# Patient Record
Sex: MALE | Race: WHITE | URBAN - METROPOLITAN AREA
[De-identification: names, ages, dates, MRNs, and addresses within clinical notes are randomized per-mention and may not be internally consistent; named-entity substitution may affect disease eponyms.]

---

## 2021-05-27 ENCOUNTER — RECORDS - HEALTHEAST (OUTPATIENT)
Dept: ADMINISTRATIVE | Facility: CLINIC | Age: 76
End: 2021-05-27

## 2021-05-29 ENCOUNTER — RECORDS - HEALTHEAST (OUTPATIENT)
Dept: ADMINISTRATIVE | Facility: CLINIC | Age: 76
End: 2021-05-29

## 2025-03-05 ENCOUNTER — APPOINTMENT (OUTPATIENT)
Dept: CT IMAGING | Facility: HOSPITAL | Age: 80
DRG: 494 | End: 2025-03-05
Attending: STUDENT IN AN ORGANIZED HEALTH CARE EDUCATION/TRAINING PROGRAM
Payer: COMMERCIAL

## 2025-03-05 ENCOUNTER — HOSPITAL ENCOUNTER (INPATIENT)
Facility: HOSPITAL | Age: 80
DRG: 494 | End: 2025-03-05
Attending: EMERGENCY MEDICINE | Admitting: STUDENT IN AN ORGANIZED HEALTH CARE EDUCATION/TRAINING PROGRAM
Payer: COMMERCIAL

## 2025-03-05 ENCOUNTER — APPOINTMENT (OUTPATIENT)
Dept: RADIOLOGY | Facility: HOSPITAL | Age: 80
DRG: 494 | End: 2025-03-05
Attending: EMERGENCY MEDICINE
Payer: COMMERCIAL

## 2025-03-05 DIAGNOSIS — S82.832A CLOSED FRACTURE OF DISTAL END OF LEFT FIBULA, UNSPECIFIED FRACTURE MORPHOLOGY, INITIAL ENCOUNTER: ICD-10-CM

## 2025-03-05 DIAGNOSIS — S82.832A CLOSED FRACTURE OF PROXIMAL END OF LEFT FIBULA, UNSPECIFIED FRACTURE MORPHOLOGY, INITIAL ENCOUNTER: ICD-10-CM

## 2025-03-05 DIAGNOSIS — I77.810 AORTIC ROOT DILATION: ICD-10-CM
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
## 2025-03-05 DIAGNOSIS — S82.042A CLOSED DISPLACED COMMINUTED FRACTURE OF LEFT PATELLA, INITIAL ENCOUNTER: Primary | ICD-10-CM

## 2025-03-05 DIAGNOSIS — Z78.9 ALCOHOL USE: ICD-10-CM

## 2025-03-05 PROBLEM — C44.91 BCC (BASAL CELL CARCINOMA): Status: ACTIVE | Noted: 2025-03-05

## 2025-03-05 PROBLEM — R73.02 IGT (IMPAIRED GLUCOSE TOLERANCE): Status: ACTIVE | Noted: 2025-03-05

## 2025-03-05 PROBLEM — N20.0 NEPHROLITHIASIS: Status: ACTIVE | Noted: 2025-03-05

## 2025-03-05 LAB
ALBUMIN SERPL BCG-MCNC: 4.4 G/DL (ref 3.5–5.2)
ALP SERPL-CCNC: 73 U/L (ref 40–150)
ALT SERPL W P-5'-P-CCNC: 25 U/L (ref 0–70)
ANION GAP SERPL CALCULATED.3IONS-SCNC: 12 MMOL/L (ref 7–15)
AST SERPL W P-5'-P-CCNC: 34 U/L (ref 0–45)
ATRIAL RATE - MUSE: 78 BPM
BASOPHILS # BLD AUTO: 0.1 10E3/UL (ref 0–0.2)
BASOPHILS NFR BLD AUTO: 1 %
BILIRUB DIRECT SERPL-MCNC: 0.09 MG/DL (ref 0–0.3)
BILIRUB SERPL-MCNC: 0.6 MG/DL
BUN SERPL-MCNC: 22.8 MG/DL (ref 8–23)
CALCIUM SERPL-MCNC: 9.6 MG/DL (ref 8.8–10.4)
CHLORIDE SERPL-SCNC: 105 MMOL/L (ref 98–107)
CREAT SERPL-MCNC: 1.01 MG/DL (ref 0.67–1.17)
DIASTOLIC BLOOD PRESSURE - MUSE: 80 MMHG
EGFRCR SERPLBLD CKD-EPI 2021: 76 ML/MIN/1.73M2
EOSINOPHIL # BLD AUTO: 0.2 10E3/UL (ref 0–0.7)
EOSINOPHIL NFR BLD AUTO: 2 %
ERYTHROCYTE [DISTWIDTH] IN BLOOD BY AUTOMATED COUNT: 13.2 % (ref 10–15)
ETHANOL SERPL-MCNC: <0.01 G/DL
GLUCOSE SERPL-MCNC: 123 MG/DL (ref 70–99)
HCO3 SERPL-SCNC: 23 MMOL/L (ref 22–29)
HCT VFR BLD AUTO: 44.6 % (ref 40–53)
HGB BLD-MCNC: 15.2 G/DL (ref 13.3–17.7)
IMM GRANULOCYTES # BLD: 0.1 10E3/UL
IMM GRANULOCYTES NFR BLD: 1 %
INR PPP: 1.02 (ref 0.85–1.15)
INTERPRETATION ECG - MUSE: NORMAL
LYMPHOCYTES # BLD AUTO: 2.1 10E3/UL (ref 0.8–5.3)
LYMPHOCYTES NFR BLD AUTO: 23 %
MAGNESIUM SERPL-MCNC: 2.1 MG/DL (ref 1.7–2.3)
MCH RBC QN AUTO: 31.3 PG (ref 26.5–33)
MCHC RBC AUTO-ENTMCNC: 34.1 G/DL (ref 31.5–36.5)
MCV RBC AUTO: 92 FL (ref 78–100)
MONOCYTES # BLD AUTO: 0.9 10E3/UL (ref 0–1.3)
MONOCYTES NFR BLD AUTO: 9 %
NEUTROPHILS # BLD AUTO: 5.9 10E3/UL (ref 1.6–8.3)
NEUTROPHILS NFR BLD AUTO: 64 %
NRBC # BLD AUTO: 0 10E3/UL
NRBC BLD AUTO-RTO: 0 /100
P AXIS - MUSE: 41 DEGREES
PLATELET # BLD AUTO: 179 10E3/UL (ref 150–450)
POTASSIUM SERPL-SCNC: 4.7 MMOL/L (ref 3.4–5.3)
PR INTERVAL - MUSE: 204 MS
PROT SERPL-MCNC: 7.2 G/DL (ref 6.4–8.3)
QRS DURATION - MUSE: 100 MS
QT - MUSE: 410 MS
QTC - MUSE: 467 MS
R AXIS - MUSE: 39 DEGREES
RBC # BLD AUTO: 4.85 10E6/UL (ref 4.4–5.9)
SODIUM SERPL-SCNC: 140 MMOL/L (ref 135–145)
SYSTOLIC BLOOD PRESSURE - MUSE: 155 MMHG
T AXIS - MUSE: 8 DEGREES
TSH SERPL DL<=0.005 MIU/L-ACNC: 2.47 UIU/ML (ref 0.3–4.2)
VENTRICULAR RATE- MUSE: 78 BPM
WBC # BLD AUTO: 9.2 10E3/UL (ref 4–11)

## 2025-03-05 PROCEDURE — 36415 COLL VENOUS BLD VENIPUNCTURE: CPT | Performed by: EMERGENCY MEDICINE

## 2025-03-05 PROCEDURE — 82077 ASSAY SPEC XCP UR&BREATH IA: CPT | Performed by: EMERGENCY MEDICINE

## 2025-03-05 PROCEDURE — 93005 ELECTROCARDIOGRAM TRACING: CPT | Mod: 76

## 2025-03-05 PROCEDURE — 93005 ELECTROCARDIOGRAM TRACING: CPT

## 2025-03-05 PROCEDURE — 85025 COMPLETE CBC W/AUTO DIFF WBC: CPT | Performed by: EMERGENCY MEDICINE

## 2025-03-05 PROCEDURE — 73700 CT LOWER EXTREMITY W/O DYE: CPT | Mod: LT,XS

## 2025-03-05 PROCEDURE — 73610 X-RAY EXAM OF ANKLE: CPT | Mod: LT

## 2025-03-05 PROCEDURE — 73560 X-RAY EXAM OF KNEE 1 OR 2: CPT | Mod: LT

## 2025-03-05 PROCEDURE — 82248 BILIRUBIN DIRECT: CPT | Performed by: EMERGENCY MEDICINE

## 2025-03-05 PROCEDURE — 120N000001 HC R&B MED SURG/OB

## 2025-03-05 PROCEDURE — 80053 COMPREHEN METABOLIC PANEL: CPT | Performed by: EMERGENCY MEDICINE

## 2025-03-05 PROCEDURE — 84443 ASSAY THYROID STIM HORMONE: CPT | Performed by: EMERGENCY MEDICINE

## 2025-03-05 PROCEDURE — 80048 BASIC METABOLIC PNL TOTAL CA: CPT | Performed by: EMERGENCY MEDICINE

## 2025-03-05 PROCEDURE — 85610 PROTHROMBIN TIME: CPT | Performed by: EMERGENCY MEDICINE

## 2025-03-05 PROCEDURE — 29505 APPLICATION LONG LEG SPLINT: CPT | Mod: LT

## 2025-03-05 PROCEDURE — 93005 ELECTROCARDIOGRAM TRACING: CPT | Performed by: EMERGENCY MEDICINE

## 2025-03-05 PROCEDURE — 99285 EMERGENCY DEPT VISIT HI MDM: CPT | Mod: 25

## 2025-03-05 PROCEDURE — 80076 HEPATIC FUNCTION PANEL: CPT | Performed by: EMERGENCY MEDICINE

## 2025-03-05 PROCEDURE — 83735 ASSAY OF MAGNESIUM: CPT | Performed by: EMERGENCY MEDICINE

## 2025-03-05 PROCEDURE — 73700 CT LOWER EXTREMITY W/O DYE: CPT | Mod: LT

## 2025-03-05 PROCEDURE — 99222 1ST HOSP IP/OBS MODERATE 55: CPT | Mod: AI | Performed by: STUDENT IN AN ORGANIZED HEALTH CARE EDUCATION/TRAINING PROGRAM

## 2025-03-05 PROCEDURE — 250N000013 HC RX MED GY IP 250 OP 250 PS 637: Performed by: STUDENT IN AN ORGANIZED HEALTH CARE EDUCATION/TRAINING PROGRAM

## 2025-03-05 RX ORDER — LIDOCAINE 40 MG/G
CREAM TOPICAL
Status: DISCONTINUED | OUTPATIENT
Start: 2025-03-05 | End: 2025-03-07

## 2025-03-05 RX ORDER — ACETAMINOPHEN 325 MG/1
650 TABLET ORAL EVERY 4 HOURS PRN
Status: DISCONTINUED | OUTPATIENT
Start: 2025-03-05 | End: 2025-03-11 | Stop reason: HOSPADM

## 2025-03-05 RX ORDER — AMOXICILLIN 250 MG
1 CAPSULE ORAL 2 TIMES DAILY PRN
Status: DISCONTINUED | OUTPATIENT
Start: 2025-03-05 | End: 2025-03-11 | Stop reason: HOSPADM

## 2025-03-05 RX ORDER — OXYCODONE HYDROCHLORIDE 5 MG/1
5 TABLET ORAL EVERY 4 HOURS PRN
Status: DISCONTINUED | OUTPATIENT
Start: 2025-03-05 | End: 2025-03-06

## 2025-03-05 RX ORDER — ONDANSETRON 2 MG/ML
4 INJECTION INTRAMUSCULAR; INTRAVENOUS EVERY 6 HOURS PRN
Status: DISCONTINUED | OUTPATIENT
Start: 2025-03-05 | End: 2025-03-11 | Stop reason: HOSPADM

## 2025-03-05 RX ORDER — LORAZEPAM 2 MG/ML
1-2 INJECTION INTRAMUSCULAR EVERY 30 MIN PRN
Status: DISCONTINUED | OUTPATIENT
Start: 2025-03-05 | End: 2025-03-11 | Stop reason: HOSPADM

## 2025-03-05 RX ORDER — MULTIPLE VITAMINS W/ MINERALS TAB 9MG-400MCG
1 TAB ORAL DAILY
Status: DISCONTINUED | OUTPATIENT
Start: 2025-03-06 | End: 2025-03-11 | Stop reason: HOSPADM

## 2025-03-05 RX ORDER — HYDROMORPHONE HYDROCHLORIDE 1 MG/ML
0.3 INJECTION, SOLUTION INTRAMUSCULAR; INTRAVENOUS; SUBCUTANEOUS
Status: DISCONTINUED | OUTPATIENT
Start: 2025-03-05 | End: 2025-03-07

## 2025-03-05 RX ORDER — CALCIUM CARBONATE 500 MG/1
1000 TABLET, CHEWABLE ORAL 4 TIMES DAILY PRN
Status: DISCONTINUED | OUTPATIENT
Start: 2025-03-05 | End: 2025-03-11 | Stop reason: HOSPADM

## 2025-03-05 RX ORDER — POLYETHYLENE GLYCOL 3350 17 G/17G
17 POWDER, FOR SOLUTION ORAL 2 TIMES DAILY PRN
Status: DISCONTINUED | OUTPATIENT
Start: 2025-03-05 | End: 2025-03-11 | Stop reason: HOSPADM

## 2025-03-05 RX ORDER — ACETAMINOPHEN 650 MG/1
650 SUPPOSITORY RECTAL EVERY 4 HOURS PRN
Status: DISCONTINUED | OUTPATIENT
Start: 2025-03-05 | End: 2025-03-11 | Stop reason: HOSPADM

## 2025-03-05 RX ORDER — OXYCODONE HYDROCHLORIDE 5 MG/1
10 TABLET ORAL EVERY 4 HOURS PRN
Status: DISCONTINUED | OUTPATIENT
Start: 2025-03-05 | End: 2025-03-06

## 2025-03-05 RX ORDER — ONDANSETRON 4 MG/1
4 TABLET, ORALLY DISINTEGRATING ORAL EVERY 6 HOURS PRN
Status: DISCONTINUED | OUTPATIENT
Start: 2025-03-05 | End: 2025-03-11 | Stop reason: HOSPADM

## 2025-03-05 RX ORDER — LORAZEPAM 0.5 MG/1
1-2 TABLET ORAL EVERY 30 MIN PRN
Status: DISCONTINUED | OUTPATIENT
Start: 2025-03-05 | End: 2025-03-11 | Stop reason: HOSPADM

## 2025-03-05 RX ORDER — FOLIC ACID 1 MG/1
1 TABLET ORAL DAILY
Status: DISCONTINUED | OUTPATIENT
Start: 2025-03-06 | End: 2025-03-11 | Stop reason: HOSPADM

## 2025-03-05 RX ORDER — AMOXICILLIN 250 MG
2 CAPSULE ORAL 2 TIMES DAILY PRN
Status: DISCONTINUED | OUTPATIENT
Start: 2025-03-05 | End: 2025-03-11 | Stop reason: HOSPADM

## 2025-03-05 RX ORDER — FLUMAZENIL 0.1 MG/ML
0.2 INJECTION, SOLUTION INTRAVENOUS
Status: DISCONTINUED | OUTPATIENT
Start: 2025-03-05 | End: 2025-03-11 | Stop reason: HOSPADM

## 2025-03-05 RX ADMIN — ACETAMINOPHEN 650 MG: 325 TABLET ORAL at 23:31

## 2025-03-05 RX ADMIN — OXYCODONE HYDROCHLORIDE 10 MG: 5 TABLET ORAL at 23:31

## 2025-03-05 ASSESSMENT — ACTIVITIES OF DAILY LIVING (ADL)
ADLS_ACUITY_SCORE: 45

## 2025-03-05 ASSESSMENT — LIFESTYLE VARIABLES
AUDITORY DISTURBANCES: NOT PRESENT
ANXIETY: MILDLY ANXIOUS
VISUAL DISTURBANCES: NOT PRESENT
NAUSEA AND VOMITING: NO NAUSEA AND NO VOMITING
PAROXYSMAL SWEATS: NO SWEAT VISIBLE
AGITATION: NORMAL ACTIVITY
TREMOR: NO TREMOR
ORIENTATION AND CLOUDING OF SENSORIUM: ORIENTED AND CAN DO SERIAL ADDITIONS
HEADACHE, FULLNESS IN HEAD: NOT PRESENT
TOTAL SCORE: 1

## 2025-03-06 ENCOUNTER — APPOINTMENT (OUTPATIENT)
Dept: RADIOLOGY | Facility: HOSPITAL | Age: 80
DRG: 494 | End: 2025-03-06
Attending: STUDENT IN AN ORGANIZED HEALTH CARE EDUCATION/TRAINING PROGRAM
Payer: COMMERCIAL

## 2025-03-06 VITALS
RESPIRATION RATE: 18 BRPM | OXYGEN SATURATION: 95 % | DIASTOLIC BLOOD PRESSURE: 72 MMHG | SYSTOLIC BLOOD PRESSURE: 152 MMHG | HEART RATE: 75 BPM | WEIGHT: 210 LBS | TEMPERATURE: 98.1 F

## 2025-03-06 LAB
ABO + RH BLD: NORMAL
ANION GAP SERPL CALCULATED.3IONS-SCNC: 10 MMOL/L (ref 7–15)
BLD GP AB SCN SERPL QL: NEGATIVE
BUN SERPL-MCNC: 20.3 MG/DL (ref 8–23)
CALCIUM SERPL-MCNC: 9.1 MG/DL (ref 8.8–10.4)
CHLORIDE SERPL-SCNC: 106 MMOL/L (ref 98–107)
CREAT SERPL-MCNC: 0.93 MG/DL (ref 0.67–1.17)
EGFRCR SERPLBLD CKD-EPI 2021: 84 ML/MIN/1.73M2
ERYTHROCYTE [DISTWIDTH] IN BLOOD BY AUTOMATED COUNT: 13.2 % (ref 10–15)
GLUCOSE SERPL-MCNC: 114 MG/DL (ref 70–99)
HCO3 SERPL-SCNC: 24 MMOL/L (ref 22–29)
HCT VFR BLD AUTO: 41.7 % (ref 40–53)
HGB BLD-MCNC: 14.3 G/DL (ref 13.3–17.7)
MAGNESIUM SERPL-MCNC: 2.1 MG/DL (ref 1.7–2.3)
MCH RBC QN AUTO: 31.1 PG (ref 26.5–33)
MCHC RBC AUTO-ENTMCNC: 34.3 G/DL (ref 31.5–36.5)
MCV RBC AUTO: 91 FL (ref 78–100)
PHOSPHATE SERPL-MCNC: 2.9 MG/DL (ref 2.5–4.5)
PLATELET # BLD AUTO: 162 10E3/UL (ref 150–450)
POTASSIUM SERPL-SCNC: 4.1 MMOL/L (ref 3.4–5.3)
RBC # BLD AUTO: 4.6 10E6/UL (ref 4.4–5.9)
SODIUM SERPL-SCNC: 140 MMOL/L (ref 135–145)
SPECIMEN EXP DATE BLD: NORMAL
WBC # BLD AUTO: 9.5 10E3/UL (ref 4–11)

## 2025-03-06 PROCEDURE — 250N000013 HC RX MED GY IP 250 OP 250 PS 637: Performed by: STUDENT IN AN ORGANIZED HEALTH CARE EDUCATION/TRAINING PROGRAM

## 2025-03-06 PROCEDURE — 120N000001 HC R&B MED SURG/OB

## 2025-03-06 PROCEDURE — C1713 ANCHOR/SCREW BN/BN,TIS/BN: HCPCS | Performed by: STUDENT IN AN ORGANIZED HEALTH CARE EDUCATION/TRAINING PROGRAM

## 2025-03-06 PROCEDURE — 710N000009 HC RECOVERY PHASE 1, LEVEL 1, PER MIN: Performed by: STUDENT IN AN ORGANIZED HEALTH CARE EDUCATION/TRAINING PROGRAM

## 2025-03-06 PROCEDURE — 0QSK04Z REPOSITION LEFT FIBULA WITH INTERNAL FIXATION DEVICE, OPEN APPROACH: ICD-10-PCS | Performed by: STUDENT IN AN ORGANIZED HEALTH CARE EDUCATION/TRAINING PROGRAM

## 2025-03-06 PROCEDURE — 85014 HEMATOCRIT: CPT | Performed by: STUDENT IN AN ORGANIZED HEALTH CARE EDUCATION/TRAINING PROGRAM

## 2025-03-06 PROCEDURE — 36415 COLL VENOUS BLD VENIPUNCTURE: CPT | Performed by: STUDENT IN AN ORGANIZED HEALTH CARE EDUCATION/TRAINING PROGRAM

## 2025-03-06 PROCEDURE — 258N000003 HC RX IP 258 OP 636: Performed by: STUDENT IN AN ORGANIZED HEALTH CARE EDUCATION/TRAINING PROGRAM

## 2025-03-06 PROCEDURE — 999N000180 XR SURGERY CARM FLUORO LESS THAN 5 MIN

## 2025-03-06 PROCEDURE — 83735 ASSAY OF MAGNESIUM: CPT | Performed by: STUDENT IN AN ORGANIZED HEALTH CARE EDUCATION/TRAINING PROGRAM

## 2025-03-06 PROCEDURE — 999N000141 HC STATISTIC PRE-PROCEDURE NURSING ASSESSMENT: Performed by: STUDENT IN AN ORGANIZED HEALTH CARE EDUCATION/TRAINING PROGRAM

## 2025-03-06 PROCEDURE — 80048 BASIC METABOLIC PNL TOTAL CA: CPT | Performed by: STUDENT IN AN ORGANIZED HEALTH CARE EDUCATION/TRAINING PROGRAM

## 2025-03-06 PROCEDURE — 370N000017 HC ANESTHESIA TECHNICAL FEE, PER MIN: Performed by: STUDENT IN AN ORGANIZED HEALTH CARE EDUCATION/TRAINING PROGRAM

## 2025-03-06 PROCEDURE — 250N000011 HC RX IP 250 OP 636: Performed by: PHYSICIAN ASSISTANT

## 2025-03-06 PROCEDURE — 250N000025 HC SEVOFLURANE, PER MIN: Performed by: STUDENT IN AN ORGANIZED HEALTH CARE EDUCATION/TRAINING PROGRAM

## 2025-03-06 PROCEDURE — 272N000001 HC OR GENERAL SUPPLY STERILE: Performed by: STUDENT IN AN ORGANIZED HEALTH CARE EDUCATION/TRAINING PROGRAM

## 2025-03-06 PROCEDURE — 0QSF04Z REPOSITION LEFT PATELLA WITH INTERNAL FIXATION DEVICE, OPEN APPROACH: ICD-10-PCS | Performed by: STUDENT IN AN ORGANIZED HEALTH CARE EDUCATION/TRAINING PROGRAM

## 2025-03-06 PROCEDURE — 84100 ASSAY OF PHOSPHORUS: CPT | Performed by: STUDENT IN AN ORGANIZED HEALTH CARE EDUCATION/TRAINING PROGRAM

## 2025-03-06 PROCEDURE — 360N000084 HC SURGERY LEVEL 4 W/ FLUORO, PER MIN: Performed by: STUDENT IN AN ORGANIZED HEALTH CARE EDUCATION/TRAINING PROGRAM

## 2025-03-06 PROCEDURE — 250N000011 HC RX IP 250 OP 636: Performed by: STUDENT IN AN ORGANIZED HEALTH CARE EDUCATION/TRAINING PROGRAM

## 2025-03-06 PROCEDURE — 250N000013 HC RX MED GY IP 250 OP 250 PS 637: Performed by: PHYSICIAN ASSISTANT

## 2025-03-06 PROCEDURE — 86900 BLOOD TYPING SEROLOGIC ABO: CPT | Performed by: PHYSICIAN ASSISTANT

## 2025-03-06 PROCEDURE — 271N000001 HC OR GENERAL SUPPLY NON-STERILE: Performed by: STUDENT IN AN ORGANIZED HEALTH CARE EDUCATION/TRAINING PROGRAM

## 2025-03-06 DEVICE — IMP SCR SYN CAN 4.0X14MM FT SS 206.014: Type: IMPLANTABLE DEVICE | Site: ANKLE | Status: FUNCTIONAL

## 2025-03-06 DEVICE — IMPLANTABLE DEVICE: Type: IMPLANTABLE DEVICE | Site: ANKLE | Status: FUNCTIONAL

## 2025-03-06 DEVICE — IMP SCR SYN CORTEX 3.5X22MM SELF TAP SS 204.822: Type: IMPLANTABLE DEVICE | Site: ANKLE | Status: FUNCTIONAL

## 2025-03-06 DEVICE — IMP SCR SYN 2.7X16MM T8 SD LOCKING SELF-TAP VA 02.211.016: Type: IMPLANTABLE DEVICE | Site: KNEE | Status: FUNCTIONAL

## 2025-03-06 DEVICE — IMPLANTABLE DEVICE: Type: IMPLANTABLE DEVICE | Site: KNEE | Status: FUNCTIONAL

## 2025-03-06 DEVICE — IMP PLATE SYN 1/3 TUBULAR 93MM 08H SS 241.381: Type: IMPLANTABLE DEVICE | Site: ANKLE | Status: FUNCTIONAL

## 2025-03-06 DEVICE — IMP SCR SYN CAN 4.0X18MM FT SS 206.018: Type: IMPLANTABLE DEVICE | Site: ANKLE | Status: FUNCTIONAL

## 2025-03-06 DEVICE — IMP SCR SYN 2.7X14MM T8 SD LOCKING SELF-TAP VA 02.211.014: Type: IMPLANTABLE DEVICE | Site: KNEE | Status: FUNCTIONAL

## 2025-03-06 DEVICE — IMP SCR SYN CAN 4.0X16MM FT SS 206.016: Type: IMPLANTABLE DEVICE | Site: ANKLE | Status: FUNCTIONAL

## 2025-03-06 DEVICE — IMP SCR SYN CORTEX 3.5X14MM SELF TAP SS 204.814: Type: IMPLANTABLE DEVICE | Site: ANKLE | Status: FUNCTIONAL

## 2025-03-06 RX ORDER — NALOXONE HYDROCHLORIDE 0.4 MG/ML
0.1 INJECTION, SOLUTION INTRAMUSCULAR; INTRAVENOUS; SUBCUTANEOUS
Status: DISCONTINUED | OUTPATIENT
Start: 2025-03-06 | End: 2025-03-06 | Stop reason: HOSPADM

## 2025-03-06 RX ORDER — LIDOCAINE 40 MG/G
CREAM TOPICAL
Status: DISCONTINUED | OUTPATIENT
Start: 2025-03-06 | End: 2025-03-06 | Stop reason: HOSPADM

## 2025-03-06 RX ORDER — FENTANYL CITRATE 50 UG/ML
25-100 INJECTION, SOLUTION INTRAMUSCULAR; INTRAVENOUS
Status: DISCONTINUED | OUTPATIENT
Start: 2025-03-06 | End: 2025-03-06 | Stop reason: HOSPADM

## 2025-03-06 RX ORDER — AMOXICILLIN 250 MG
1 CAPSULE ORAL 2 TIMES DAILY
Status: DISCONTINUED | OUTPATIENT
Start: 2025-03-06 | End: 2025-03-11 | Stop reason: HOSPADM

## 2025-03-06 RX ORDER — MAGNESIUM SULFATE 4 G/50ML
4 INJECTION INTRAVENOUS ONCE
Status: COMPLETED | OUTPATIENT
Start: 2025-03-06 | End: 2025-03-06

## 2025-03-06 RX ORDER — NALOXONE HYDROCHLORIDE 0.4 MG/ML
0.2 INJECTION, SOLUTION INTRAMUSCULAR; INTRAVENOUS; SUBCUTANEOUS
Status: DISCONTINUED | OUTPATIENT
Start: 2025-03-06 | End: 2025-03-11 | Stop reason: HOSPADM

## 2025-03-06 RX ORDER — SODIUM CHLORIDE, SODIUM LACTATE, POTASSIUM CHLORIDE, CALCIUM CHLORIDE 600; 310; 30; 20 MG/100ML; MG/100ML; MG/100ML; MG/100ML
INJECTION, SOLUTION INTRAVENOUS CONTINUOUS
Status: DISCONTINUED | OUTPATIENT
Start: 2025-03-06 | End: 2025-03-06 | Stop reason: HOSPADM

## 2025-03-06 RX ORDER — OXYCODONE HYDROCHLORIDE 5 MG/1
10 TABLET ORAL
Status: CANCELLED | OUTPATIENT
Start: 2025-03-06

## 2025-03-06 RX ORDER — NALOXONE HYDROCHLORIDE 0.4 MG/ML
0.4 INJECTION, SOLUTION INTRAMUSCULAR; INTRAVENOUS; SUBCUTANEOUS
Status: DISCONTINUED | OUTPATIENT
Start: 2025-03-06 | End: 2025-03-11 | Stop reason: HOSPADM

## 2025-03-06 RX ORDER — ACETAMINOPHEN 325 MG/1
975 TABLET ORAL ONCE
Status: COMPLETED | OUTPATIENT
Start: 2025-03-06 | End: 2025-03-06

## 2025-03-06 RX ORDER — HYDROMORPHONE HCL IN WATER/PF 6 MG/30 ML
0.2 PATIENT CONTROLLED ANALGESIA SYRINGE INTRAVENOUS
Status: DISCONTINUED | OUTPATIENT
Start: 2025-03-06 | End: 2025-03-09

## 2025-03-06 RX ORDER — CEFAZOLIN SODIUM/WATER 2 G/20 ML
2 SYRINGE (ML) INTRAVENOUS
Status: DISCONTINUED | OUTPATIENT
Start: 2025-03-06 | End: 2025-03-06

## 2025-03-06 RX ORDER — SODIUM CHLORIDE, SODIUM LACTATE, POTASSIUM CHLORIDE, CALCIUM CHLORIDE 600; 310; 30; 20 MG/100ML; MG/100ML; MG/100ML; MG/100ML
INJECTION, SOLUTION INTRAVENOUS CONTINUOUS
Status: DISCONTINUED | OUTPATIENT
Start: 2025-03-06 | End: 2025-03-07

## 2025-03-06 RX ORDER — FENTANYL CITRATE 50 UG/ML
25 INJECTION, SOLUTION INTRAMUSCULAR; INTRAVENOUS EVERY 5 MIN PRN
Status: DISCONTINUED | OUTPATIENT
Start: 2025-03-06 | End: 2025-03-06 | Stop reason: HOSPADM

## 2025-03-06 RX ORDER — IBUPROFEN 600 MG/1
600 TABLET, FILM COATED ORAL EVERY 6 HOURS PRN
Status: DISCONTINUED | OUTPATIENT
Start: 2025-03-06 | End: 2025-03-11 | Stop reason: HOSPADM

## 2025-03-06 RX ORDER — POLYETHYLENE GLYCOL 3350 17 G/17G
17 POWDER, FOR SOLUTION ORAL DAILY
Status: DISCONTINUED | OUTPATIENT
Start: 2025-03-07 | End: 2025-03-11 | Stop reason: HOSPADM

## 2025-03-06 RX ORDER — ASPIRIN 81 MG/1
81 TABLET ORAL 2 TIMES DAILY
Status: DISCONTINUED | OUTPATIENT
Start: 2025-03-06 | End: 2025-03-11 | Stop reason: HOSPADM

## 2025-03-06 RX ORDER — OXYCODONE HYDROCHLORIDE 5 MG/1
5 TABLET ORAL EVERY 4 HOURS PRN
Status: DISCONTINUED | OUTPATIENT
Start: 2025-03-06 | End: 2025-03-11 | Stop reason: HOSPADM

## 2025-03-06 RX ORDER — OXYCODONE HYDROCHLORIDE 5 MG/1
10 TABLET ORAL EVERY 4 HOURS PRN
Status: DISCONTINUED | OUTPATIENT
Start: 2025-03-06 | End: 2025-03-11 | Stop reason: HOSPADM

## 2025-03-06 RX ORDER — HYDROXYZINE HYDROCHLORIDE 10 MG/1
10 TABLET, FILM COATED ORAL EVERY 6 HOURS PRN
Status: DISCONTINUED | OUTPATIENT
Start: 2025-03-06 | End: 2025-03-11 | Stop reason: HOSPADM

## 2025-03-06 RX ORDER — CEFAZOLIN SODIUM/WATER 2 G/20 ML
2 SYRINGE (ML) INTRAVENOUS EVERY 8 HOURS
Status: DISCONTINUED | OUTPATIENT
Start: 2025-03-06 | End: 2025-03-06

## 2025-03-06 RX ORDER — HYDROMORPHONE HCL IN WATER/PF 6 MG/30 ML
0.4 PATIENT CONTROLLED ANALGESIA SYRINGE INTRAVENOUS EVERY 5 MIN PRN
Status: DISCONTINUED | OUTPATIENT
Start: 2025-03-06 | End: 2025-03-06 | Stop reason: HOSPADM

## 2025-03-06 RX ORDER — CEFAZOLIN SODIUM/WATER 2 G/20 ML
2 SYRINGE (ML) INTRAVENOUS SEE ADMIN INSTRUCTIONS
Status: DISCONTINUED | OUTPATIENT
Start: 2025-03-06 | End: 2025-03-06

## 2025-03-06 RX ORDER — ONDANSETRON 4 MG/1
4 TABLET, ORALLY DISINTEGRATING ORAL EVERY 30 MIN PRN
Status: DISCONTINUED | OUTPATIENT
Start: 2025-03-06 | End: 2025-03-06 | Stop reason: HOSPADM

## 2025-03-06 RX ORDER — FENTANYL CITRATE 50 UG/ML
50 INJECTION, SOLUTION INTRAMUSCULAR; INTRAVENOUS EVERY 5 MIN PRN
Status: DISCONTINUED | OUTPATIENT
Start: 2025-03-06 | End: 2025-03-06 | Stop reason: HOSPADM

## 2025-03-06 RX ORDER — ACETAMINOPHEN 325 MG/1
975 TABLET ORAL ONCE
Status: DISCONTINUED | OUTPATIENT
Start: 2025-03-06 | End: 2025-03-06

## 2025-03-06 RX ORDER — HYDROMORPHONE HYDROCHLORIDE 1 MG/ML
0.5 INJECTION, SOLUTION INTRAMUSCULAR; INTRAVENOUS; SUBCUTANEOUS
Status: DISCONTINUED | OUTPATIENT
Start: 2025-03-06 | End: 2025-03-09

## 2025-03-06 RX ORDER — ONDANSETRON 2 MG/ML
4 INJECTION INTRAMUSCULAR; INTRAVENOUS EVERY 6 HOURS PRN
Status: DISCONTINUED | OUTPATIENT
Start: 2025-03-06 | End: 2025-03-06

## 2025-03-06 RX ORDER — PROCHLORPERAZINE MALEATE 5 MG/1
5 TABLET ORAL EVERY 6 HOURS PRN
Status: DISCONTINUED | OUTPATIENT
Start: 2025-03-06 | End: 2025-03-11 | Stop reason: HOSPADM

## 2025-03-06 RX ORDER — ONDANSETRON 2 MG/ML
4 INJECTION INTRAMUSCULAR; INTRAVENOUS EVERY 30 MIN PRN
Status: DISCONTINUED | OUTPATIENT
Start: 2025-03-06 | End: 2025-03-06 | Stop reason: HOSPADM

## 2025-03-06 RX ORDER — HYDROMORPHONE HCL IN WATER/PF 6 MG/30 ML
0.2 PATIENT CONTROLLED ANALGESIA SYRINGE INTRAVENOUS EVERY 5 MIN PRN
Status: DISCONTINUED | OUTPATIENT
Start: 2025-03-06 | End: 2025-03-06 | Stop reason: HOSPADM

## 2025-03-06 RX ORDER — ONDANSETRON 4 MG/1
4 TABLET, ORALLY DISINTEGRATING ORAL EVERY 6 HOURS PRN
Status: DISCONTINUED | OUTPATIENT
Start: 2025-03-06 | End: 2025-03-06

## 2025-03-06 RX ORDER — DEXAMETHASONE SODIUM PHOSPHATE 4 MG/ML
4 INJECTION, SOLUTION INTRA-ARTICULAR; INTRALESIONAL; INTRAMUSCULAR; INTRAVENOUS; SOFT TISSUE
Status: DISCONTINUED | OUTPATIENT
Start: 2025-03-06 | End: 2025-03-06 | Stop reason: HOSPADM

## 2025-03-06 RX ORDER — OXYCODONE HYDROCHLORIDE 5 MG/1
5 TABLET ORAL
Status: CANCELLED | OUTPATIENT
Start: 2025-03-06

## 2025-03-06 RX ORDER — CEFAZOLIN SODIUM 2 G/50ML
2 SOLUTION INTRAVENOUS EVERY 8 HOURS
Status: COMPLETED | OUTPATIENT
Start: 2025-03-06 | End: 2025-03-07

## 2025-03-06 RX ORDER — ACETAMINOPHEN 325 MG/1
975 TABLET ORAL EVERY 8 HOURS
Status: DISCONTINUED | OUTPATIENT
Start: 2025-03-06 | End: 2025-03-11 | Stop reason: HOSPADM

## 2025-03-06 RX ORDER — BISACODYL 10 MG
10 SUPPOSITORY, RECTAL RECTAL DAILY PRN
Status: DISCONTINUED | OUTPATIENT
Start: 2025-03-09 | End: 2025-03-11 | Stop reason: HOSPADM

## 2025-03-06 RX ORDER — FLUMAZENIL 0.1 MG/ML
0.2 INJECTION, SOLUTION INTRAVENOUS
Status: DISCONTINUED | OUTPATIENT
Start: 2025-03-06 | End: 2025-03-06 | Stop reason: HOSPADM

## 2025-03-06 RX ORDER — NALOXONE HYDROCHLORIDE 0.4 MG/ML
0.1 INJECTION, SOLUTION INTRAMUSCULAR; INTRAVENOUS; SUBCUTANEOUS
Status: CANCELLED | OUTPATIENT
Start: 2025-03-06

## 2025-03-06 RX ORDER — ONDANSETRON 4 MG/1
4 TABLET, ORALLY DISINTEGRATING ORAL EVERY 30 MIN PRN
Status: CANCELLED | OUTPATIENT
Start: 2025-03-06

## 2025-03-06 RX ORDER — LIDOCAINE 40 MG/G
CREAM TOPICAL
Status: DISCONTINUED | OUTPATIENT
Start: 2025-03-06 | End: 2025-03-11 | Stop reason: HOSPADM

## 2025-03-06 RX ORDER — ONDANSETRON 2 MG/ML
4 INJECTION INTRAMUSCULAR; INTRAVENOUS EVERY 30 MIN PRN
Status: CANCELLED | OUTPATIENT
Start: 2025-03-06

## 2025-03-06 RX ORDER — CEFAZOLIN SODIUM/WATER 2 G/20 ML
2 SYRINGE (ML) INTRAVENOUS
Status: COMPLETED | OUTPATIENT
Start: 2025-03-06 | End: 2025-03-06

## 2025-03-06 RX ORDER — CEFAZOLIN SODIUM/WATER 2 G/20 ML
2 SYRINGE (ML) INTRAVENOUS SEE ADMIN INSTRUCTIONS
Status: DISCONTINUED | OUTPATIENT
Start: 2025-03-06 | End: 2025-03-06 | Stop reason: HOSPADM

## 2025-03-06 RX ORDER — DEXAMETHASONE SODIUM PHOSPHATE 10 MG/ML
4 INJECTION, SOLUTION INTRAMUSCULAR; INTRAVENOUS
Status: CANCELLED | OUTPATIENT
Start: 2025-03-06

## 2025-03-06 RX ADMIN — FOLIC ACID 1 MG: 1 TABLET ORAL at 08:22

## 2025-03-06 RX ADMIN — THIAMINE HCL TAB 100 MG 100 MG: 100 TAB at 08:22

## 2025-03-06 RX ADMIN — CEFAZOLIN SODIUM 2 G: 2 SOLUTION INTRAVENOUS at 21:33

## 2025-03-06 RX ADMIN — MAGNESIUM SULFATE HEPTAHYDRATE 4 G: 80 INJECTION, SOLUTION INTRAVENOUS at 13:23

## 2025-03-06 RX ADMIN — OXYCODONE HYDROCHLORIDE 10 MG: 5 TABLET ORAL at 11:35

## 2025-03-06 RX ADMIN — Medication 1 TABLET: at 08:22

## 2025-03-06 RX ADMIN — OXYCODONE HYDROCHLORIDE 10 MG: 5 TABLET ORAL at 04:45

## 2025-03-06 RX ADMIN — ACETAMINOPHEN 975 MG: 325 TABLET ORAL at 13:26

## 2025-03-06 RX ADMIN — FENTANYL CITRATE 50 MCG: 50 INJECTION, SOLUTION INTRAMUSCULAR; INTRAVENOUS at 17:38

## 2025-03-06 RX ADMIN — FENTANYL CITRATE 25 MCG: 50 INJECTION, SOLUTION INTRAMUSCULAR; INTRAVENOUS at 17:43

## 2025-03-06 RX ADMIN — SENNOSIDES AND DOCUSATE SODIUM 1 TABLET: 50; 8.6 TABLET ORAL at 19:25

## 2025-03-06 RX ADMIN — ASPIRIN 81 MG: 81 TABLET, COATED ORAL at 19:25

## 2025-03-06 RX ADMIN — SODIUM CHLORIDE, SODIUM LACTATE, POTASSIUM CHLORIDE, AND CALCIUM CHLORIDE: .6; .31; .03; .02 INJECTION, SOLUTION INTRAVENOUS at 13:30

## 2025-03-06 RX ADMIN — FENTANYL CITRATE 25 MCG: 50 INJECTION, SOLUTION INTRAMUSCULAR; INTRAVENOUS at 17:54

## 2025-03-06 RX ADMIN — ACETAMINOPHEN 975 MG: 325 TABLET ORAL at 19:25

## 2025-03-06 ASSESSMENT — ACTIVITIES OF DAILY LIVING (ADL)
ADLS_ACUITY_SCORE: 42
ADLS_ACUITY_SCORE: 42
ADLS_ACUITY_SCORE: 41
ADLS_ACUITY_SCORE: 42
ADLS_ACUITY_SCORE: 45
ADLS_ACUITY_SCORE: 41
DEPENDENT_IADLS:: INDEPENDENT
ADLS_ACUITY_SCORE: 34
ADLS_ACUITY_SCORE: 41
ADLS_ACUITY_SCORE: 34
ADLS_ACUITY_SCORE: 42
ADLS_ACUITY_SCORE: 41

## 2025-03-06 NOTE — ED NOTES
Bed: JNED-19  Expected date: 3/5/25  Expected time:   Means of arrival:   Comments:  ALLINA; 80M, FALL, OBVIOUS DEFORMITY

## 2025-03-06 NOTE — H&P
[facility]    History and Physical - Hospitalist Service       Date of Admission:  3/5/2025    Assessment & Plan      Kyle Lopez is a 80 year old male with a no significant past medical history who was admitted on 3/5/25 after presenting to [facility] ED for Multiple Fractures of the Left Lower Extremity..    #Left Patella Fracture  #Left Proximal Fibula Fracture  #Left Distal Fibula Fracture  #Left Medial Malleolus Fracture  - XR in the ED showing fractures as above after fall at home - slipping on ice.  - Sounds like mechanical fall - denies dizziness, syncope, LOC.  Plan  - Orthopedic Surgery Team contacted from ED - consult order placed.  - Checking CT of Left Knee, Left Ankle for surgical planning  - NPO after midnight, holding DVT prophylaxis.  - Pain Regimen - Tylenol, oxycodone prn, dilaudid IV prn  - PT/OT consulted  - Patient denies hitting head, Neuro exam normal - hold off on CT Head.    #Mechanical Fall  - Patient with fall as above leading to multiple fractures of LLE.   - Fall sounds mechanical - slipped on ice - no dizziness/syncope  Plan  - Cardiac Monitor  - Checking Echo  - PT/OT as above    #Alcohol Use Disorder  - Patient reports drinking 2-3 drinks everyday  - EtOH level in the ED was 0.00  - Patient denies history of significant withdrawal including DT or seizure.  Plan  - Added CIWA protocol for now  - MVI, folic acid, thiamine started on admission.                Diet: Combination Diet Regular Diet Adult  NPO for Procedure/Surgery per Anesthesia Guidelines Except for: Meds; Clear liquids before procedure/surgery: ADULT (Age GREATER than or Equal to 18 years) - Clear liquids 2 hours before procedure/surgery    DVT Prophylaxis: Pneumatic Compression Devices and holding DVT prophylaxis with plan for surgery.  Monteiro Catheter: Not present  Lines: None     Cardiac Monitoring: ACTIVE order. Indication: Tachyarrhythmias, acute (48 hours)  Code Status: Full Code    Clinically  Significant Risk Factors Present on Admission                                        Disposition Plan     Medically Ready for Discharge: Anticipated in 2-4 Days           Yusuf Donato MD  Hospitalist Service  [facility]  Securely message with [employer] (more info)  Text page via [employer] Paging/Directory     ______________________________________________________________________    Chief Complaint   Mechanical Fall and Left Leg Pain    History is obtained from the patient, electronic health record, emergency department physician, and patient's spouse    History of Present Illness   Kyle Lopez is a 80 year old male with a no significant past medical history who was admitted on 3/5/25 after presenting to [facility] ED for Multiple Fractures of the Left Lower Extremity..    Earlier in the evening on 3/5/25 patient was taking out the garbage at his home when he slipped on some ice in his driveway around 7pm. Patient denies any dizziness, syncope, or LOC contributing to the fall. Patient landed primarily on his left knee and ankle and had immediate pain. Has been unable to walk since falling. Did not hit his head.      Past Medical History    No past medical history on file.    Past Surgical History   No past surgical history on file.    Prior to Admission Medications   None        Social History   I have reviewed this patient's social history and updated it with pertinent information if needed.         Allergies   Allergies   Allergen Reactions    Ciprofloxacin Other (See Comments) and Rash     Patient reports blood in the urine.        Physical Exam   Vital Signs: Temp: 98  F (36.7  C) Temp src: Oral BP: (!) 168/88 Pulse: 87   Resp: 22 SpO2: 97 %      Weight: 0 lbs 0 oz    General: Alert and Oriented x3. Answers questions appropriately. Follows commands.  Eyes:EOMI. PERRL. Normal Conjunctivae.  HENT: Normocephalic. Atraumatic.  Resp: Breath sounds equal throughout. No crackles. No wheezing.  Cardio:  Regular rate and rhythm. No murmurs. No friction rub.  Abd: Soft. Non-distended. Non-tender. Bowel sounds normal. No rebound tenderness.  MSK:  - LLE in knee immobilizer and left ankle brace  Neuro: CN 2-12 grossly intact..  Skin:No rashes. No jaundice.       Medical Decision Making       60 MINUTES SPENT BY ME on the date of service doing chart review, history, exam, documentation & further activities per the note.  MANAGEMENT DISCUSSED with the following over the past 24 hours: RN, ED Physician   Tests ORDERED & REVIEWED in the past 24 hours:  - BMP  - CBC  - Magnesium  Medical complexity over the past 24 hours:  - Prescription DRUG MANAGEMENT performed      Data     I have personally reviewed the following data over the past 24 hrs:    9.2  \   15.2   / 179     140 105 22.8 /  123 (H)   4.7 23 1.01 \     ALT: 25 AST: 34 AP: 73 TBILI: 0.6   ALB: 4.4 TOT PROTEIN: 7.2 LIPASE: N/A     TSH: 2.47 T4: N/A A1C: N/A     INR:  1.02 PTT:  N/A   D-dimer:  N/A Fibrinogen:  N/A       Imaging results reviewed over the past 24 hrs:   Recent Results (from the past 24 hours)   XR Ankle Left G/E 3 Views    Narrative    EXAM: XR ANKLE LEFT G/E 3 VIEWS  LOCATION: [facility]  DATE: 3/5/2025    INDICATION: Fall, ankle swelling.  COMPARISON: None.      Impression    IMPRESSION: Acute mildly displaced oblique fracture of the distal fibula extending distally to the level of the syndesmosis. Equivocal nondisplaced fracture medial malleolus versus prominent nutrient foramen. Mild tibiotalar joint arthrosis. Ankle   mortise is intact. No evidence for a posterior malleolus fracture on this study. Soft tissue swelling. Bones are demineralized.    NOTE: ABNORMAL REPORT    THE DICTATION ABOVE DESCRIBES AN ABNORMALITY FOR WHICH FOLLOW-UP IS NEEDED.    XR Knee Left 1/2 Views    Narrative    EXAM: XR KNEE LEFT 1/2 VIEWS  LOCATION: [facility]  DATE: 3/5/2025    INDICATION: Fall, knee pain, ruptured  extensor mechanism.  COMPARISON: None.      Impression    IMPRESSION: Comminuted, mildly displaced fracture involving the proximal fibular shaft extending towards the fibular neck.    Comminuted and markedly distracted transverse fracture through the patella with up to 6 cm of fracture fragment distraction. There is soft tissue swelling and a joint effusion. Chondrocalcinosis.    NOTE: ABNORMAL REPORT    THE DICTATION ABOVE DESCRIBES AN ABNORMALITY FOR WHICH FOLLOW-UP IS NEEDED.    CT Knee Left w/o Contrast    Narrative    EXAM: CT KNEE LEFT W/O CONTRAST  LOCATION: [facility]  DATE: 3/5/2025    INDICATION: Left knee fractures.  COMPARISON: Same day left knee radiographs.  TECHNIQUE: Noncontrast. Axial, sagittal and coronal thin-section reconstruction. Dose reduction techniques were used.     FINDINGS:     There is a comminuted, transversely oriented fracture of the patella, which is distracted approximately 2 cm. There are hemorrhagic contusive changes within the anterior left knee soft tissues. Several small foci of gas are also noted within the   overlying subcutaneous tissues; an open fracture cannot be entirely excluded. Small lipohemarthrosis, without evidence of intra-articular gas.    Additional acute, minimally displaced fracture of the proximal fibular shaft.     No additional sites of fracture identified. No suspicious lytic or blastic osseous lesions. 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    Mild senescent atrophy and fatty infiltration of the visualized left lower extremity musculature.      Impression    IMPRESSION:  1.  Comminuted and distracted fracture of the left patella. Several small foci of gas are noted within the overlying subcutaneous tissues; an open fracture cannot be entirely excluded, though no intra-articular gas is identified.  2.  Minimally displaced fracture of the proximal left fibular shaft.     CT Ankle Left w/o Contrast     Narrative    EXAM: CT ANKLE LEFT W/O CONTRAST  LOCATION: [facility]  DATE: 3/5/2025    INDICATION: Left ankle fracture.  COMPARISON: Same day left ankle radiographs.  TECHNIQUE: Noncontrast. Axial, sagittal and coronal thin-section reconstruction. Dose reduction techniques were used.     FINDINGS:     Acute, minimally displaced fracture of the distal left fibula, which terminates approximately 1 cm above the level of the tibial plafond. No evidence of widening at the distal tibiofibular syndesmosis, though ligamentous injury cannot be excluded, given   the fracture pattern.     No additional sites of acute fracture identified. Small focus of chronic heterotopic ossification is located distal to the fibular tip. The graft no suspicious lytic or blastic osseous lesions. No significant osteoarthritic changes.    Ankle tendons appear normal in course and caliber. Small amount of tenosynovium fluid is present at the master knot of Gian.     Chronic atrophy and fatty infiltration of the left foot and ankle musculature.    Mild posttraumatic soft tissue swelling of the lateral aspect of the left ankle.      Impression    IMPRESSION:  Acute, minimally displaced fracture of the distal left fibula. No evidence of widening at the distal tibiofibular syndesmosis, though ligamentous injury cannot be excluded.

## 2025-03-06 NOTE — PLAN OF CARE
"  Problem: Adult Inpatient Plan of Care  Goal: Plan of Care Review  Description: The Plan of Care Review/Shift note should be completed every shift.  The Outcome Evaluation is a brief statement about your assessment that the patient is improving, declining, or no change.  This information will be displayed automatically on your shift  note.  Outcome: Progressing     Problem: Adult Inpatient Plan of Care  Goal: Patient-Specific Goal (Individualized)  Description: You can add care plan individualizations to a care plan. Examples of Individualization might be:  \"Parent requests to be called daily at 9am for status\", \"I have a hard time hearing out of my right ear\", or \"Do not touch me to wake me up as it startles  me\".  Outcome: Progressing     Problem: Pain Acute  Goal: Optimal Pain Control and Function  Outcome: Progressing  Intervention: Develop Pain Management Plan  Recent Flowsheet Documentation  Taken 3/6/2025 0100 by Saniya Yee RN  Pain Management Interventions:   repositioned   cold applied  Intervention: Prevent or Manage Pain  Recent Flowsheet Documentation  Taken 3/6/2025 0100 by Saniya Yee RN  Medication Review/Management: medications reviewed     Problem: Fall Injury Risk  Goal: Absence of Fall and Fall-Related Injury  Outcome: Progressing     Problem: Mobility Impairment  Goal: Optimal Mobility  Outcome: Progressing   Goal Outcome Evaluation:       Pt is alert and oriented x4. /73, afebrile, RA. Came to the floor at midnight from ER. Pt CIWA score is 0, claimed his last wine was last Tuesday. Tele NSR. Complained of pain to fractured left leg 10/10, PRN Oxy 10 mg given, effective. Slept between cares.                 "

## 2025-03-06 NOTE — OP NOTE
PATIENT: Kyle Lopez  MR# :   [id number]  DATE OF OPERATION: 03/06/25    SURGEON:   MD Jesu Tovar MD     ASSISTANT: CHIN Art     A skilled assistant was required due to the nature of the case for patient position, retraction, exposure, implant placement, closure and dressing application.      Preoperative diagnosis:   Left comminuted displaced patellar fracture  Left displaced distal fibula fracture    Postoperative diagnosis:   Left comminuted displaced patellar fracture  Left displaced distal fibula fracture    Surgical procedure:   Open reduction internal fixation of comminuted displaced patellar fracture  Open reduction internal fixation of displaced left distal fibula fracture    Anesthesia:  General with regional block    Drains: None    Estimated blood loss: 100 cc    IV fluids: Please see Anesthesia Report    Complications: None identified intraoperatively     Blood products: none given     Specimens: * No specimens in log *    Findings: Comminuted, displaced patellar fracture, obliquely oriented displaced distal fibula fracture.  No evidence of syndesmotic instability.    COMPONENTS IMPLANTED:  Implant Name Type Inv. Item Serial No.  Lot No. LRB No. Used Action   2.7 VA LCKING ANT PATELLA PL SS/3-HOLE/STERILE    Depuy [id number] Left 1 Implanted   IMP SCR SYN 2.7X14MM T8 SD LOCKING SELF-TAP VA 02.211.014 - [id number] Metallic Hardware/[place] IMP SCR SYN 2.7X14MM T8 SD LOCKING SELF-TAP VA 02.211.014  [employer]  Left 2 Implanted   IMP SCR SYN 2.7X16MM T8 SD LOCKING SELF-TAP VA 02.211.016 - [id number] Metallic Hardware/[place] IMP SCR SYN 2.7X16MM T8 SD LOCKING SELF-TAP VA 02.211.016  [employer]  Left 1 Implanted   IMP SCR SYN 2.7X20MM T8 SD LOCKING SELF-TAP VA 02.211.020 - [id number] Metallic Hardware/[place] IMP SCR SYN 2.7X20MM T8 SD LOCKING SELF-TAP VA 02.211.020  [facility]-STRATEC  Left 2 Implanted   IMP SCR SYN CORTEX SELF TAP T6 STARDRIVE 2.0X16MM  201.366.97 - [id number] Metallic Hardware/[place] IMP SCR SYN CORTEX SELF TAP T6 STARDRIVE 2.0X16MM 201.366.97  [employer]  Left 1 Implanted   IMP SCR SYN CORTEX T8 STARDRIVE 2.7X40MM SELF .900 - [id number] Metallic Hardware/[place] IMP SCR SYN CORTEX T8 STARDRIVE 2.7X40MM SELF .900  [employer]  Left 2 Implanted   IMP SCR SYN CORTEX T8 STARDRIVE 2.7X42MM SELF .962 - [id number] Metallic Hardware/[place] IMP SCR SYN CORTEX T8 STARDRIVE 2.7X42MM SELF .962  [employer]AT[employer]  Left 1 Implanted       Tourniquet time: not used    INDICATIONS:    Kyle Lopez is a 80 year old male admitted for a multiple left lower extremity injuries after a fall.  He slipped on the ice and injured his knee and his ankle.  X-rays showed a displaced, comminuted patellar fracture and a displaced distal fibula fracture.  In discussions with my partner, Dr. Jesu Walker, we agreed to proceed with fixation of both fractures given the significant displacement of the patella fracture and concern for ankle instability.    Preoperatively, the nature of the procedure, risks and benefits, as well as alternatives including nonsurgical management were discussed in detail with the patient. I reviewed and discussed the patient's condition and relevant images with the patient. We discussed options for further evaluation and treatment, including conservative non-operative management versus surgical intervention.      We also discussed at length the risks and benefits of surgery. Our discussion included but was not limited to the risk of pain, bleeding, infection, blood clots (DVT, PE), wound issues, continued cpain, post-operative joint stiffness, painful arc of motion, difficulty with ambulation, damage to nearby neurovascular structures, and need for further surgeries. The possibility of intra-operative and/or post-operative medical complications such as anesthesia complications or reactions, respiratory and  cardiovascular events, stroke, heart attack and/or death were also discussed.     Specific details of the surgical procedure, hospitalization, recovery, rehabilitation, and long-term precautions were also presented. The final choices will be made at the time of procedure to match the anatomy and condition of the bone, ligaments, tendons, and muscles. All of patients questions were answered preoperatively at which time informed consent was taken.      PROCEDURE:    After proper identification of the patient including verification and marking of the surgical site, the patient was brought to the operating room.  General anesthesia was given without complications and prophylactic IV Ancef was confirmed to have been administered within the appropriate timeframe(s). The patient was placed in the supine position with All bony prominences were well padded.  The surgical site which was properly marked, was then prepped and draped in the usual sterile fashion. A timeout was done utilizing protocol to again identify the correct patient and operative site, which was marked appropriately.    Please see Dr. Walker's separate note for dictation of the patella ORIF.  Following completion of this procedure, I move forward with fixation of the ankle fracture.       I began by making a longitudinal incision in line with the distal fibula.  The superficial peroneal nerve was not encountered during this approach.  I dissected down to the distal fibula and identified the fracture.  The fracture site was cleaned out, removing any interposed periosteum and hematoma.  It was thoroughly irrigated.  I then proceeded to clamp the fracture.  The fracture was anatomically reduced.  I then placed a 3.5 mm lag screw, perpendicular to the fracture site.  I then proceeded to place a an 8 hole one third tubular plate.  This was secured proximally with 3.5 mm cortical screws and distally with 4.0 mm cancellous screws.  AP and lateral x-rays were  taken which showed appropriate fracture reduction, maintenance of ankle mortise and screw placement.     I then performed an external rotation, valgus stress and cotton test and did not see any evidence of syndesmotic widening or medial clear space widening to suggest further deltoid instability.  Final AP and lateral x-rays were taken.  The wound was copiously irrigated and closed with 2-0 Vicryl, 3-0 Monocryl and 3-0 nylon sutures.  A sterile dressing was placed. A well-padded short leg splint was then placed.    The patient was then awakened from anesthesia and transferred the PACU in stable condition.    Sponge, needle, and instrument counts were correct at the conclusion of the procedure. The patient has palpable distal pulses in both lower extremity.     Postoperative Plan:  Pain Control: Continue per pain protocol.  Weight Bearing: Non weight bearing on affected lower extremity x 2 weeks from ankle standpoint.   DVT Prophylaxis: ASA 81 mg BID  Antibiotics: 24 hours of perioperative antibiotics  GI: Plan for aggressive bowel regimen to prevent constipation from narcotic medications  Lines: HLIV once tolerating PO  PT/OT: Eval and treatment. Will follow up on recommendations.  Follow up:  in 2 weeks in clinic for wound check  Discharge plan: Likely to TCU pending progress with PT    Dispo: stable to pacu    Jamal Jean MD  Orthopedic Surgery  [facility]

## 2025-03-06 NOTE — CONSULTS
Care Management Initial Consult    General Information  Assessment completed with: Patient, Spouse or significant other,    Type of CM/SW Visit: Initial Assessment    Primary Care Provider verified and updated as needed: Yes   Readmission within the last 30 days: no previous admission in last 30 days      Reason for Consult: discharge planning  Advance Care Planning: Advance Care Planning Reviewed: no concerns identified          Communication Assessment  Patient's communication style: spoken language (English or Bilingual)    Hearing Difficulty or Deaf: no   Wear Glasses or Blind: yes    Cognitive  Cognitive/Neuro/Behavioral: WDL  Level of Consciousness: alert  Arousal Level: opens eyes spontaneously  Orientation: oriented x 4        Speech: spontaneous, clear, logical    Living Environment:   People in home: spouse  wife Anahy  Current living Arrangements: house      Able to return to prior arrangements: yes  Living Arrangement Comments: single family home, all needs on one level    Family/Social Support:  Care provided by: self  Provides care for: no one  Marital Status:   Support system: Wife, Children          Description of Support System: Supportive    Support Assessment: Adequate family and caregiver support, Adequate social supports    Current Resources:   Patient receiving home care services: No        Community Resources: None  Equipment currently used at home: none  Supplies currently used at home: None    Employment/Financial:  Employment Status: retired        Financial Concerns:             Does the patient's insurance plan have a 3 day qualifying hospital stay waiver?  Yes     Which insurance plan 3 day waiver is available? Alternative insurance waiver    Will the waiver be used for post-acute placement? Undetermined at this time    Lifestyle & Psychosocial Needs:  Social Drivers of Health     Food Insecurity: Not on file   Depression: Not at risk (2/2/2021)    Received from [facility] [facility]    PHQ-2     PHQ-2 TOTAL SCORE: 0   Housing Stability: Not on file   Tobacco Use: Medium Risk (2/2/2021)    Received from [facility]    Patient History     Smoking Tobacco Use: Former     Smokeless Tobacco Use: Unknown     Passive Exposure: Not on file   Financial Resource Strain: High Risk (12/30/2021)    Received from [facility]    Financial Resource Strain     Difficulty of Paying Living Expenses: Not on file     Difficulty of Paying Living Expenses: Not on file   Alcohol Use: Not on file   Transportation Needs: Not on file   Physical Activity: Not on file   Interpersonal Safety: Low Risk  (3/6/2025)    Interpersonal Safety     Do you feel physically and emotionally safe where you currently live?: Yes     Within the past 12 months, have you been hit, slapped, kicked or otherwise physically hurt by someone?: No     Within the past 12 months, have you been humiliated or emotionally abused in other ways by your partner or ex-partner?: No   Stress: Not on file   Social Connections: Unknown (12/30/2021)    Received from [facility]    Social Connections     Frequency of Communication with Friends and Family: Not on file   Health Literacy: Not on file       Functional Status:  Prior to admission patient needed assistance:   Dependent ADLs:: Independent  Dependent IADLs:: Independent       Mental Health Status:  Mental Health Status: No Current Concerns       Chemical Dependency Status:  Chemical Dependency Status: No Current Concerns             Values/Beliefs:  Spiritual, Cultural Beliefs, Yazidism Practices, Values that affect care:                 Discussed  Partnership in Safe Discharge Planning  document with patient/family: No    Additional Information:    Initial CM/SW assessment completed today.  Met with patient and his spouse Anahy.  They live in single-family home.  Kyle  discusses home is one-level, one step from garage to home.  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  Has been independent with all daily needs.      Patient sustained ankle fracture, slipped and fell on icy surface.  Waiting surgery, hopefully later today.  In reviewing potential discharge needs, patient and spouse confirm their preference would be for him to return home.  Kyle talks of probably being non-weight bearing for several weeks after surgery.  Discussion of being at home and they feel his needs can be met there, may benefit from some equipment to support him being at home.  Also reviewed possible recs for TCU, will discuss again after surgery and pended therapy recs.      Next Steps:     CM to continue to follow for potential discharge needs.  Waiting surgery and recommendations from them and therapy.      DOUGIE TolliverW

## 2025-03-06 NOTE — MEDICATION SCRIBE - ADMISSION MEDICATION HISTORY
Medication Scribe Admission Medication History    Admission medication history is complete. The information provided in this note is only as accurate as the sources available at the time of the update.    Information Source(s): Patient via in-person    Pertinent Information: Patient states that he does not take any medications except some vitamins.    Changes made to PTA medication list:  Added: None  Deleted: None  Changed: None    Allergies reviewed with patient and updates made in EHR: yes    Medication History Completed By: Mariano Corona 3/5/2025 10:14 PM    No outpatient medications have been marked as taking for the 3/5/25 encounter (Hospital Encounter).

## 2025-03-06 NOTE — H&P
Kyle was seen and evaluated in the preoperative holding area.  Patient does have Left patellar comminuted fracture as well as Left ankle fracture (involving distal and proximal fibula).  Dicussed risks andbenefits of surgical intervention.  Recommending ORIF for Left patellar and for Dr. Jean is also recommending ORIF for Left ankle fracture for treatment.  Kyle and his wife, Anahy, are interested in proceeding. Informed consent was signed in preoperative holding area.  Plan to proceed on 3/6/2025.     Jesu Walker MD  [facility]

## 2025-03-06 NOTE — PROGRESS NOTES
[facility]    Medicine Progress Note - Hospitalist Service    Date of Admission:  3/5/2025    Assessment & Plan   Kyle Lopez is a 80 year old male with a no significant past medical history who was admitted on 3/5/25 after presenting to [facility] ED for Multiple Fractures of the Left Lower Extremity..     Left Patella Fracture  Left Proximal Fibula Fracture  Left Distal Fibula Fracture  Left Medial Malleolus Fracture  - XR in the ED showing fractures as above after fall at home - slipping on ice.  - Sounds like mechanical fall - denies dizziness, syncope, LOC.  - Orthopedic Surgery Team contacted from ED  - Pain Regimen - Tylenol, oxycodone prn, dilaudid IV prn  - PT/OT consulted  - Patient denies hitting head, Neuro exam normal - hold off on CT Head.  - no hx of CAD, no chest pain reported this time.   - EKG with NSR  - echocardiogram was ordered   - patient can proceed with the planned surgery with no further work up.      Mechanical Fall  - Patient with fall as above leading to multiple fractures of LLE.   - Fall sounds mechanical - slipped on ice - no dizziness/syncope  - Cardiac Monitor  - Checking Echo  - PT/OT as above     Alcohol Use Disorder  - Patient reports drinking 2-3 drinks everyday  - EtOH level in the ED was 0.00  - Patient denies history of significant withdrawal including DT or seizure.  - MVI, folic acid, thiamine started on admission.  - continue to monitor            Diet: NPO for Procedure/Surgery per Anesthesia Guidelines Except for: Meds; Clear liquids before procedure/surgery: ADULT (Age GREATER than or Equal to 18 years) - Clear liquids 2 hours before procedure/surgery    DVT Prophylaxis: Pneumatic Compression Devices  Monteiro Catheter: Not present  Lines: None     Cardiac Monitoring: ACTIVE order. Indication: Tachyarrhythmias, acute (48 hours)  Code Status: Full Code      Clinically Significant Risk Factors Present on Admission   { TIP  This section helps capture the  "illness of the patient on admission.     - Review diagnoses highlighted in blue; right click, edit & delete if not appropriate   - If blank, no additional diagnoses identified   :79681}                                     Social Drivers of Health    Tobacco Use: Medium Risk (2/2/2021)    Received from [facility]    Patient History     Smoking Tobacco Use: Former     Smokeless Tobacco Use: Unknown    Received from [facility]    Financial Resource Strain    [facility]    Social Connections          Disposition Plan   {TIP  The patient's Medical Readiness for Discharge [MRD] has been documented today or is 'Ready Now'. Last Documentation- Anticipated in 2-4 Days   Use SmartList below if a change is needed.  :09834}  Medically Ready for Discharge: Anticipated in 2-4 Days         Lisy Baker MD  Hospitalist Service  [facility]  Securely message with [employer] (more info)  Text page via [employer] Paging/Directory   ______________________________________________________________________    Interval History       Physical Exam   Vital Signs: Temp: 98.5  F (36.9  C) Temp src: Oral BP: (!) 156/78 Pulse: 81   Resp: 20 SpO2: 93 % O2 Device: None (Room air)    Weight: 0 lbs 0 oz    General Appearance: ***  Respiratory: ***  Cardiovascular: ***  GI: ***  Skin: ***  Other: ***     Medical Decision Making   { TIP   MDM Calculator    MDM grid (w/ times)    Coding Support Chat  Billing is now based on time OR medical decision making complexity. Medical decision making included in your A&P does NOT need to be re-documented here.    :55700}    {Time  :093521::\"*** MINUTES SPENT BY ME on the date of service doing chart review, history, exam, documentation & further activities per the note.\"}      Data   {INSERT LABS/IMAGING (OPTIONAL)   :255782}  "

## 2025-03-06 NOTE — CONSULTS
ORTHOPEDIC CONSULTATION    Consultation  Kyle Lopez,  1945, MRN [id number]    Alcohol use [Z78.9]  Closed displaced comminuted fracture of left patella, initial encounter [S82.042A]  Fall due to slipping on ice or snow, initial encounter [W00.9XXA]  Closed fracture of proximal end of left fibula, unspecified fracture morphology, initial encounter [S82.832A]  Closed fracture of distal end of left fibula, unspecified fracture morphology, initial encounter [S82.832A]    PCP: Moose Barron, [phone]   Code status:  Full Code       Extended Emergency Contact Information  Primary Emergency Contact: renetta lopez  Mobile Phone: [phone]  Relation: Spouse  Secondary Emergency Contact: petty lopez  Mobile Phone: [phone]  Relation: Son         IMPRESSION:  80-year-old male with closed distracted transverse patella fracture, minimally displaced proximal fibula fracture, and minimally displaced Strickland B distal fibula fracture, possible syndesmotic disruption but appears stable on CT scan     PLAN:  This patient was discussed with Dr. Jean/Dr. Walker, on-call surgeon for [facility] and they are in agreement with the following plan.   -Will plan for surgical intervention this afternoon.  This will include distal fibular ORIF with Dr. Jean, immediately followed by patellar ORIF with Dr. Walker.  I discussed the surgery in detail with the patient as well as risk/benefits and he is in agreement with proceeding  -Nonweightbearing left lower extremity  -Keep knee immobilizer and cam boot in place until surgery  -Hold any anticoagulation  -Medical optimization per primary team.  Echo pending.  Hemoglobin 14.3  -Anesthesia to place block prior to surgery for improved pain control    Thank you for including [facility] in the care of Kyle Lopez. It has been a pleasure participating in their care.        CHIEF COMPLAINT: <principal problem not specified>    HISTORY OF PRESENT  ILLNESS:  The patient is seen in orthopedic consultation at the request of Lisy Baker, * for multiple left lower extremity injuries.  The patient is a 80 year old male    Today patient is experiencing pain in his left lower extremity primarily in the anterior aspect of the knee and the ankle.  He had a fall yesterday when he was moving his trash cans at the end of the driveway and slipped on black ice causing him to twist his ankle and land directly onto the left knee.  He was brought into the emergency department by ambulance where radiographs demonstrated a distal fibula fracture, proximal fibula fracture and patella fracture.  He was placed in a knee immobilizer and a cam boot, he is fairly comfortable at rest at the time my visit.  He does not have any numbness or tingling in his left lower extremity.  No history of surgeries in this area.  He does note a history of osteoarthritis in the bilateral knees, was planning to look into total knee replacement with Dr. Barron as an outpatient later this summer or fall.    Patient lives on a 1 level house with his wife.  He weightbears and ambulates independently at baseline besides some discomfort due to his bilateral knee osteoarthritis.  Denies CAD, CVA, HF, CKD, COPD, lung cancer.  Has not smoked cigarettes in 40 years.    ALLERGIES:   Review of patient's allergies indicates   Allergies   Allergen Reactions    Ciprofloxacin Other (See Comments) and Rash     Patient reports blood in the urine.         MEDICATIONS UPON ADMISSION:  Medications were reviewed.  They include:   No medications prior to admission.         SOCIAL HISTORY:   he        FAMILY HISTORY:  family history is not on file.      REVIEW OF SYSTEMS:   Reviewed with patient. See HPI, otherwise negative       PHYSICAL EXAMINATION:  Vitals: BP (!) 148/71 (BP Location: Left arm)   Pulse 77   Temp 98.3  F (36.8  C) (Oral)   Resp 20   SpO2 95%   General: On examination, the patient is resting  comfortably, NAD, awake, and alert and oriented to person, place, time, and, and general circumstances   SKIN: There is obvious swelling over the anterior knee and lateral ankle.  No noted breaks in the skin in either area.  No erythema  Pulses:  Dorsalis pedis and posterior tibialis pulse is intact and equal bilaterally  Sensation: intact and equal bilaterally to the distal lower extremities.  Tenderness: Expected tenderness over the knee and ankle no unexpected bony tenderness  ROM: Able to wiggle toes.  Deferred knee and ankle range of motion    Contralateral side= Full range of motion, Negative joint instability findings, 5/5 motor groups about the joint, Non-tender.       RADIOGRAPHIC EVALUATION:  Personally reviewed  EXAM: CT ANKLE LEFT W/O CONTRAST  LOCATION: [facility]  DATE: 3/5/2025     INDICATION: Left ankle fracture.  COMPARISON: Same day left ankle radiographs.  TECHNIQUE: Noncontrast. Axial, sagittal and coronal thin-section reconstruction. Dose reduction techniques were used.      FINDINGS:      Acute, minimally displaced fracture of the distal left fibula, which terminates approximately 1 cm above the level of the tibial plafond. No evidence of widening at the distal tibiofibular syndesmosis, though ligamentous injury cannot be excluded, given   the fracture pattern.      No additional sites of acute fracture identified. Small focus of chronic heterotopic ossification is located distal to the fibular tip. The graft no suspicious lytic or blastic osseous lesions. No significant osteoarthritic changes.     Ankle tendons appear normal in course and caliber. Small amount of tenosynovium fluid is present at the master knot of Gian.      Chronic atrophy and fatty infiltration of the left foot and ankle musculature.     Mild posttraumatic soft tissue swelling of the lateral aspect of the left ankle.                                                                       IMPRESSION:  Acute, minimally displaced fracture of the distal left fibula. No evidence of widening at the distal tibiofibular syndesmosis, though ligamentous injury cannot be excluded.       EXAM: CT KNEE LEFT W/O CONTRAST  LOCATION: [facility]  DATE: 3/5/2025     INDICATION: Left knee fractures.  COMPARISON: Same day left knee radiographs.  TECHNIQUE: Noncontrast. Axial, sagittal and coronal thin-section reconstruction. Dose reduction techniques were used.      FINDINGS:      There is a comminuted, transversely oriented fracture of the patella, which is distracted approximately 2 cm. There are hemorrhagic contusive changes within the anterior left knee soft tissues. Several small foci of gas are also noted within the   overlying subcutaneous tissues; an open fracture cannot be entirely excluded. Small lipohemarthrosis, without evidence of intra-articular gas.     Additional acute, minimally displaced fracture of the proximal fibular shaft.      No additional sites of fracture identified. No suspicious lytic or blastic osseous lesions. 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     Mild senescent atrophy and fatty infiltration of the visualized left lower extremity musculature.                                                                      IMPRESSION:  1.  Comminuted and distracted fracture of the left patella. Several small foci of gas are noted within the overlying subcutaneous tissues; an open fracture cannot be entirely excluded, though no intra-articular gas is identified.  2.  Minimally displaced fracture of the proximal left fibular shaft.    PERTINENT LABS:  Personally reviewed  Recent Labs   Lab Test 03/06/25  0511 03/05/25 2034   INR  --  1.02   HGB 14.3 15.2    179         SUNNY CABEZAS PA-C  Date: 3/6/2025  Time: 10:30 AM  [facility]    CC1:   Lisy Baker, *     meniscal chondrocalcinosis.     Mild senescent atrophy and fatty infiltration of the visualized left lower extremity musculature.                                                                      IMPRESSION:  1.  Comminuted and distracted fracture of the left patella. Several small foci of gas are noted within the overlying subcutaneous tissues; an open fracture cannot be entirely excluded, though no intra-articular gas is identified.  2.  Minimally displaced fracture of the proximal left fibular shaft.    PERTINENT LABS:  Personally reviewed  Recent Labs   Lab Test 03/06/25  0511 03/05/25 2034   INR  --  1.02   HGB 14.3 15.2    179         Jesu Walker MD / SUNNY CABEZAS PA-C  Date: 3/6/2025  Time: 10:30 AM  [facility]    CC1:   Lisy Baker, *

## 2025-03-06 NOTE — ED PROVIDER NOTES
EMERGENCY DEPARTMENT ENCOUNTER      NAME: Kyle Lopez  AGE: 80 year old male  YOB: 1945  MRN: [id number]  EVALUATION DATE & TIME: 3/5/2025  8:18 PM    PCP: No primary care provider on file.    ED PROVIDER: Daniel Sampson M.D.      Chief Complaint   Patient presents with    Fall    Knee Injury         IMPRESSION  1. Closed displaced comminuted fracture of left patella, initial encounter    2. Fall due to slipping on ice or snow, initial encounter    3. Alcohol use    4. Closed fracture of proximal end of left fibula, unspecified fracture morphology, initial encounter    5. Closed fracture of distal end of left fibula, unspecified fracture morphology, initial encounter        PLAN  - admit to hospitalist for further care with Orthopedic Surgery consulting; med/surg admit    ED COURSE & MEDICAL DECISION MAKING    ED Course as of 03/05/25 2221   Wed Mar 05, 2025   2111 X-rays left ankle independently reviewed & interpreted by me: spiral fracture of distal fibula proximal to ankle joint, no ankle dislocation, mortise intact.   2114 X-rays left knee independently reviewed & interpreted by me: fractured patella with high- & low-riding portions, proximal spiral proximal fibula fracture.   2129 80yoM with no daily medication use presenting per EMS for evaluation of left knee pain & weakness after slip and fall on ice tonight. States he was taking out the trash tonight when he slipped and fell on ice; flexed left knee landed on a knob of ice with resulting pain, swelling, inability to move his knee. Denies any head injury, LOC. No pains other than his knee; pain mild now. Given 650mg Tylenol per EMS. Was otherwise feeling well earlier today. Notes he has chronic arthritis of his knee and has been told he will likely need a knee replacement in the future.    SBP 170s on presentation with otherwise normal vitals. Exam with palpable split patella of left knee with 0/5 strength to left knee extension, 5/5  strength to knee flexion, no knee laxity, mild lateral distal lower leg swelling with no tenderness to medial/lateral malleolus, no knee/midfoot/toe tenderness with distal CMS intact. Rest of exam is atraumatic with clear lungs, normal work of breathing, benign abdomen, clear mentation.    High concern for patella tendon rupture with patella fracture per exam. X-rays obtained and has patella fracture; also with proximal & distal fibula fracture suggestive of syndesmotic injury. No medial ankle tenderness on exam to suggest fracture here (radiology read of X-rays question this).    Wife at bedside at this time. Patient still declines any pain meds. Does not think he will be able to be NWB at home. Suspect will require admission. Will discuss with Orthopedic Surgery. Patient & wife comfortable with this plan; no further questions at this time.   2131 Discussed with Dr. Hammer of Orthopedic Surgery who recommends admission to hospitalist for surgery tomorrow; CAM boot & knee immobilizer to LLE in the meantime; he will order additional preop imaging as well.    Hospitalist paged at this time.   2220 Hospitalist called back. I discussed the case with them and they agreed to admission. Patient & wife understood and agreed with the plan; no further questions at the time of admission.       --------------------------------------------------------------------------------   --------------------------------------------------------------------------------     8:22 PM I met with the patient for the initial interview and physical examination. Discussed plan for treatment and workup in the ED.      This patient involved a high degree of complexity in medical decision making, as significant risks were present and assessed. Recent encounters & results in medical record reviewed by me.    All workup (i.e. any EKG/labs/imaging as per charting below) reviewed and independently interpreted by me. See respective sections for  details.        See additional MDM below if interested.      MEDICATIONS GIVEN IN THE EMERGENCY DEPARTMENT  Medications - No data to display            =================================================================      HPI  Use of : N/A.        Kyle Lopez is a 80 year old male with no daily medication use presenting per EMS for evaluation of left knee pain & weakness after slip and fall on ice tonight. States he was taking out the trash tonight when he slipped and fell on ice; flexed left knee landed on a knob of ice with resulting pain, swelling, inability to move his knee. Denies any head injury, LOC. No pains other than his knee; pain mild now. Given 650mg Tylenol per EMS. Was otherwise feeling well earlier today. Notes he has chronic arthritis of his knee and has been told he will likely need a knee replacement in the future.    Of note, patient states he drinks 2-3 glasses of wine daily for ~10-15 years. Denies drinking tonight though.      --------------- MEDICAL HISTORY ---------------  PAST MEDICAL HISTORY:  Reviewed independently by me.  No past medical history on file.  Patient Active Problem List   Diagnosis    BCC (basal cell carcinoma)    IGT (impaired glucose tolerance)    Nephrolithiasis    Alcohol use    Closed displaced comminuted fracture of left patella, initial encounter    Fall due to slipping on ice or snow, initial encounter    Closed fracture of proximal end of left fibula, unspecified fracture morphology, initial encounter    Closed fracture of distal end of left fibula, unspecified fracture morphology, initial encounter       PAST SURGICAL HISTORY:  Reviewed independently by me.  No past surgical history on file.    CURRENT MEDICATIONS:    Reviewed independently by me.  No current facility-administered medications for this encounter.  No current outpatient medications on file.    ALLERGIES:  Reviewed independently by me.  Allergies   Allergen Reactions    Ciprofloxacin  Other (See Comments) and Rash     Patient reports blood in the urine.       FAMILY HISTORY:  Reviewed independently by me.  No family history on file.    SOCIAL HISTORY:   Reviewed independently by me.  Social History     Socioeconomic History    Marital status:      Social Drivers of Health      Received from [facility]    Financial Resource Strain    Received from [facility]    Social Connections       --------------- PHYSICAL EXAM ---------------  Nursing notes and vitals independently reviewed by me.  VITALS:  Vitals:    03/05/25 2024 03/05/25 2039 03/05/25 2054 03/05/25 2109   BP: (!) 176/83 (!) 165/85 (!) 164/88 (!) 155/80   Pulse: 68 72 74 75   Resp:  14 15 16   Temp:       TempSrc:       SpO2: 99% 100% 99% 98%       PHYSICAL EXAM:    General:  alert, interactive, no distress  Eyes:  conjunctivae clear, conjugate gaze, PERRL @ 3mm with EOMI  HENT:  atraumatic, no scalp tenderness or visual evidence of trauma, nose with no rhinorrhea, oropharynx clear  Neck:  no meningismus, no c-spine tenderness with painless active ROM intact  Cardiovascular:  HR 70s during exam, regular rhythm, no murmurs, brisk cap refill  Chest:  no chest wall tenderness  Pulmonary:  no stridor, normal phonation, normal work of breathing, clear lungs bilaterally  Abdomen:  soft, nondistended, nontender  :  no CVA tenderness  Back:  no midline spinal tenderness  Musculoskeletal:    BUE:  atraumatic with painless active ROM intact, overlying skin intact, distal CMS intact  RLE:  atraumatic with painless active ROM intact, overlying skin intact, distal CMS intact  LLE:  knee with split patella with both high-riding & low-riding portions, palpable soft deformity distal to patella with mild bruising, 0/5 strength to knee extension with 5/5 knee flexion strength intact, no knee laxity; mild lateral ankle swelling with no tenderness or pain with ROM, no hip tenderness  or pain with ROM, no midfoot or toe tenderness or pain with ROM; distal CMS intact  Skin:  warm, dry, no rash  Neuro:  awake, alert, answers questions appropriately, follows commands, moves all limbs  Psych:  calm, normal affect      --------------- RESULTS ---------------  EKG #1:    Reviewed and independently interpreted by me.  - poor wavy baseline, questionable a-fib at 77bpm, no ST or T wave changes, QRS 98, QTc 479  - no priors for comparison  My read.    EKG #2 (obtained given NSR on monitor):    Reviewed and interpreted by me.  - better baseline compared to prior; NSR at 78bpm, no ST or T wave changes, , , QTc 467  - more clearly NSR compared to earlier tonight  My read.    LAB:  Reviewed and independently interpreted by me.  Results for orders placed or performed during the hospital encounter of 03/05/25   XR Ankle Left G/E 3 Views    Impression    IMPRESSION: Acute mildly displaced oblique fracture of the distal fibula extending distally to the level of the syndesmosis. Equivocal nondisplaced fracture medial malleolus versus prominent nutrient foramen. Mild tibiotalar joint arthrosis. Ankle   mortise is intact. No evidence for a posterior malleolus fracture on this study. Soft tissue swelling. Bones are demineralized.    NOTE: ABNORMAL REPORT    THE DICTATION ABOVE DESCRIBES AN ABNORMALITY FOR WHICH FOLLOW-UP IS NEEDED.    XR Knee Left 1/2 Views    Impression    IMPRESSION: Comminuted, mildly displaced fracture involving the proximal fibular shaft extending towards the fibular neck.    Comminuted and markedly distracted transverse fracture through the patella with up to 6 cm of fracture fragment distraction. There is soft tissue swelling and a joint effusion. Chondrocalcinosis.    NOTE: ABNORMAL REPORT    THE DICTATION ABOVE DESCRIBES AN ABNORMALITY FOR WHICH FOLLOW-UP IS NEEDED.    Result Value Ref Range    INR 1.02 0.85 - 1.15   Basic metabolic panel   Result Value Ref Range    Sodium 140  135 - 145 mmol/L    Potassium 4.7 3.4 - 5.3 mmol/L    Chloride 105 98 - 107 mmol/L    Carbon Dioxide (CO2) 23 22 - 29 mmol/L    Anion Gap 12 7 - 15 mmol/L    Urea Nitrogen 22.8 8.0 - 23.0 mg/dL    Creatinine 1.01 0.67 - 1.17 mg/dL    GFR Estimate 76 >60 mL/min/1.73m2    Calcium 9.6 8.8 - 10.4 mg/dL    Glucose 123 (H) 70 - 99 mg/dL   Hepatic function panel   Result Value Ref Range    Protein Total 7.2 6.4 - 8.3 g/dL    Albumin 4.4 3.5 - 5.2 g/dL    Bilirubin Total 0.6 <=1.2 mg/dL    Alkaline Phosphatase 73 40 - 150 U/L    AST 34 0 - 45 U/L    ALT 25 0 - 70 U/L    Bilirubin Direct 0.09 0.00 - 0.30 mg/dL   CBC with platelets and differential   Result Value Ref Range    WBC Count 9.2 4.0 - 11.0 10e3/uL    RBC Count 4.85 4.40 - 5.90 10e6/uL    Hemoglobin 15.2 13.3 - 17.7 g/dL    Hematocrit 44.6 40.0 - 53.0 %    MCV 92 78 - 100 fL    MCH 31.3 26.5 - 33.0 pg    MCHC 34.1 31.5 - 36.5 g/dL    RDW 13.2 10.0 - 15.0 %    Platelet Count 179 150 - 450 10e3/uL    % Neutrophils 64 %    % Lymphocytes 23 %    % Monocytes 9 %    % Eosinophils 2 %    % Basophils 1 %    % Immature Granulocytes 1 %    NRBCs per 100 WBC 0 <1 /100    Absolute Neutrophils 5.9 1.6 - 8.3 10e3/uL    Absolute Lymphocytes 2.1 0.8 - 5.3 10e3/uL    Absolute Monocytes 0.9 0.0 - 1.3 10e3/uL    Absolute Eosinophils 0.2 0.0 - 0.7 10e3/uL    Absolute Basophils 0.1 0.0 - 0.2 10e3/uL    Absolute Immature Granulocytes 0.1 <=0.4 10e3/uL    Absolute NRBCs 0.0 10e3/uL   Alcohol level blood   Result Value Ref Range    Alcohol ethyl <0.01 <=0.01 g/dL   Result Value Ref Range    Magnesium 2.1 1.7 - 2.3 mg/dL   TSH with free T4 reflex   Result Value Ref Range    TSH 2.47 0.30 - 4.20 uIU/mL       RADIOLOGY:  Reviewed and independently interpreted by me. Please see official radiology report.  Recent Results (from the past 24 hours)   XR Ankle Left G/E 3 Views    Narrative    EXAM: XR ANKLE LEFT G/E 3 VIEWS  LOCATION: [facility]  DATE:  3/5/2025    INDICATION: Fall, ankle swelling.  COMPARISON: None.      Impression    IMPRESSION: Acute mildly displaced oblique fracture of the distal fibula extending distally to the level of the syndesmosis. Equivocal nondisplaced fracture medial malleolus versus prominent nutrient foramen. Mild tibiotalar joint arthrosis. Ankle   mortise is intact. No evidence for a posterior malleolus fracture on this study. Soft tissue swelling. Bones are demineralized.    NOTE: ABNORMAL REPORT    THE DICTATION ABOVE DESCRIBES AN ABNORMALITY FOR WHICH FOLLOW-UP IS NEEDED.    XR Knee Left 1/2 Views    Narrative    EXAM: XR KNEE LEFT 1/2 VIEWS  LOCATION: [facility]  DATE: 3/5/2025    INDICATION: Fall, knee pain, ruptured extensor mechanism.  COMPARISON: None.      Impression    IMPRESSION: Comminuted, mildly displaced fracture involving the proximal fibular shaft extending towards the fibular neck.    Comminuted and markedly distracted transverse fracture through the patella with up to 6 cm of fracture fragment distraction. There is soft tissue swelling and a joint effusion. Chondrocalcinosis.    NOTE: ABNORMAL REPORT    THE DICTATION ABOVE DESCRIBES AN ABNORMALITY FOR WHICH FOLLOW-UP IS NEEDED.                --------------- ADDITIONAL MDM ---------------  MIPS:  Not Applicable    History:  - I considered systemic symptoms of the presenting illness.  - Supplemental history from:       -- patient, wife, EMS  - External Record(s) reviewed:       -- Inpatient/outpatient record, prior labs, prior imaging       -- see above ED course & MDM for further details    Workup:  - Chart documentation above includes differential considered and any EKGs or imaging independently interpreted by provider.  - In additional to work up documented, I considered the following work up:       -- see above ED course & MDM for further details    External Consultation:  - Discussion of management with another provider:       -- see  above charting for additional    Complicating Factors:  - Care impacted by chronic illness:       -- see above MDM, past medical history, & problem list    Disposition Considerations:  - Admit           I, Graciela Anderson, am serving as a scribe to document services personally performed by Dr. Daniel Sampson based on my observation and the provider's statements to me. I, Daniel Sampson MD attest that Graciela Anderson is acting in a scribe capacity, has observed my performance of the services and has documented them in accordance with my direction.      Daniel Sampson MD  03/05/25  Emergency Medicine  [facility] EMERGENCY DEPARTMENT  [address] [place] [id number] [phone]  Dept: [phone]      Daniel Sampson MD  03/05/25 2229

## 2025-03-06 NOTE — TREATMENT PLAN
Ortho Note:    Contacted about Kyle cuellar 80M who had a fall at home tonight.    Patient not personally evaluated at time of note.    Imaging reviewed and discussed case with ED staff.    Left ankle XR demonstrate a left distal fibula fracture. No clear medial or posterior malleolar fractures visualized on radiographs.    Left Knee XR demonstrate a comminuted distal pole left patella fracture with significant displacement. Additionally there is a left proximal fibular neck fracture.    Plan:  - Anticipate surgical management of left knee, possibly left ankle tomorrow, 3/6/2025  - CT left knee and CT left ankle (ordered)  - Preop labs  - Admit to medicine. Appreciate perioperative optimization and clearance  - Splint or CAM boot LLE, Knee immobilizer left knee   - NWB LLE  - Full consult in AM    Ajit Hammer MD  [facility]

## 2025-03-06 NOTE — PROGRESS NOTES
SPIRITUAL HEALTH SERVICES Note     Saw pt Kyle Lopez and administered [employer] sacrament of anointing for the healing of the sick.    Fr. Bert Hutchinson

## 2025-03-06 NOTE — ED NOTES
[facility] ED Handoff Report    ED Chief Complaint: Fall.    ED Diagnosis:  (S82.042A) Closed displaced comminuted fracture of left patella, initial encounter  (primary encounter diagnosis)  Comment:   Plan: Case Request: OPEN REDUCTION INTERNAL FIXATION,        FRACTURE, PATELLA, OPEN REDUCTION INTERNAL         FIXATION, FRACTURE, ANKLE, REPAIR, TENDON,         PATELLAR        For surgery in the morning.    (W00.9XXA) Fall due to slipping on ice or snow, initial encounter  Comment:   Plan: For surgery in the morning.    (Z78.9) Alcohol use  Comment:   Plan: CIWA screening.    (S82.832A) Closed fracture of proximal end of left fibula, unspecified fracture morphology, initial encounter  Comment:   Plan: For surgery in the morning.    (S82.832A) Closed fracture of distal end of left fibula, unspecified fracture morphology, initial encounter  Comment:   Plan: Case Request: OPEN REDUCTION INTERNAL FIXATION,        FRACTURE, PATELLA, OPEN REDUCTION INTERNAL         FIXATION, FRACTURE, ANKLE, REPAIR, TENDON,         PATELLAR               PMH:  No past medical history on file.     Code Status:  No Order     Falls Risk: Yes Band: Applied    Current Living Situation/Residence: lives with a significant other     Elimination Status: Continent: Yes     Activity Level: 2 assist    Patients Preferred Language:  English     Needed: No    Vital Signs:  BP (!) 168/88   Pulse 87   Temp 98  F (36.7  C) (Oral)   Resp 22   SpO2 97%      Cardiac Rhythm:     Pain Score: 5/10    Is the Patient Confused:  No    Last Food or Drink: 03/05/25     Focused Assessment:  Left knee and ankle injury.    Tests Performed: Done: Labs and Imaging    Treatments Provided:      Family Dynamics/Concerns: No    Family Updated On Visitor Policy: No    Plan of Care Communicated to Family: No    Who Was Updated about Plan of Care: Wife.    Belongings Checklist Done and Signed by Patient: No    Belongings Sent with Patient:      Medications sent with patient:     Covid: asymptomatic     Additional Information: Patient NPO at midnight.    RN: Jessie Armstrong   3/5/2025 11:03 PM

## 2025-03-06 NOTE — OP NOTE
Operative Report    PATIENT Kyle Lopez   DATE OF SURGERY:  3/5/2025 - 3/6/2025      PREOPERATIVE DIAGNOSIS   Closed displaced comminuted fracture of left patella, initial encounter [S82.042A]  Closed fracture of distal end of left fibula, unspecified fracture morphology, initial encounter [S82.832A].    POSTOPERATIVE DIAGNOSIS   Closed displaced comminuted fracture of left patella, initial encounter [S82.042A]  Closed fracture of distal end of left fibula, unspecified fracture morphology, initial encounter [S82.832A].    PROCEDURE PERFORMED   left patellar fracture open reduction internal fixation (ORIF)  Left ankle ORIF (See Dr. Jean' note)    IMPLANTS  Implant Name Type Inv. Item Serial No.  Lot No. LRB No. Used Action   2.7 VA LCKING ANT PATELLA PL SS/3-HOLE/STERILE    Depuy [id number] Left 1 Implanted   IMP SCR SYN 2.7X14MM T8 SD LOCKING SELF-TAP VA 02.211.014 - [id number] Metallic Hardware/[place] IMP SCR SYN 2.7X14MM T8 SD LOCKING SELF-TAP VA 02.211.014  [facility]  Left 2 Implanted   IMP SCR SYN 2.7X16MM T8 SD LOCKING SELF-TAP VA 02.211.016 - [id number] Metallic Hardware/[place] IMP SCR SYN 2.7X16MM T8 SD LOCKING SELF-TAP VA 02.211.016  [facility]  Left 1 Implanted   IMP SCR SYN 2.7X20MM T8 SD LOCKING SELF-TAP VA 02.211.020 - [id number] Metallic Hardware/[place] IMP SCR SYN 2.7X20MM T8 SD LOCKING SELF-TAP VA 02.211.020  [facility]  Left 2 Implanted   IMP SCR SYN CORTEX SELF TAP T6 STARDRIVE 2.0X16MM 201.366.97 - [id number] Metallic Hardware/[place] IMP SCR SYN CORTEX SELF TAP T6 STARDRIVE 2.0X16MM 201.366.97  [facility]  Left 1 Implanted   IMP SCR SYN CORTEX T8 STARDRIVE 2.7X40MM SELF .900 - [id number] Metallic Hardware/[place] IMP SCR SYN CORTEX T8 STARDRIVE 2.7X40MM SELF .900  [employer]  Left 2 Implanted   IMP SCR SYN CORTEX T8 STARDRIVE 2.7X42MM SELF .962 - [id number] Metallic Hardware/[place] IMP SCR SYN CORTEX T8 STARDRIVE 2.7X42MM SELF .962   [employer]  Left 1 Implanted   IMP SCR SYN 2.7X20MM T8 SD LOCKING SELF-TAP VA 02.211.020 - [id number] Metallic Hardware/[place] IMP SCR SYN 2.7X20MM T8 SD LOCKING SELF-TAP VA 02.211.020  [employer]  Left 1 Wasted   IMP SCR SYN CORTEX T8 STARDRIVE 2.7X44MM SELF .963 - [id number] Metallic Hardware/[place] IMP SCR SYN CORTEX T8 STARDRIVE 2.7X44MM SELF .963  [employer]  Left 1 Wasted   IMP SCR SYN CORTEX 3.5X14MM SELF TAP .814 - [id number] Metallic Hardware/[place] IMP SCR SYN CORTEX 3.5X14MM SELF TAP .814  [employer]  Left 1 Implanted   IMP SCR SYN CORTEX 3.5X22MM SELF TAP .822 - [id number] Metallic Hardware/[place] IMP SCR SYN CORTEX 3.5X22MM SELF TAP .822  [employer]  Left 1 Implanted   IMP SCR SYN CAN 4.0X14MM FT .014 - [id number] Metallic Hardware/[place] IMP SCR SYN CAN 4.0X14MM FT .014  [employer]  Left 1 Implanted   IMP SCR SYN CAN 4.0X16MM FT .016 - [id number] Metallic Hardware/[place] IMP SCR SYN CAN 4.0X16MM FT .016  [employer]  Left 1 Implanted   IMP SCR SYN CAN 4.0X18MM FT .018 - [id number] Metallic Hardware/[place] IMP SCR SYN CAN 4.0X18MM FT .018  [employer]  Left 1 Implanted   IMP PLATE SYN 1/3 TUBULAR 93MM 08H .381 - [id number] Metallic Hardware/[place] IMP PLATE SYN 1/3 TUBULAR 93MM 08H .381  [employer]  Left 1 Implanted       SURGEON  Jesu Walker MD  Co-surgeon, Kyle Jean MD    ASSISTANT   CHIN Roche  A skilled assistant was required for patient positioning, surgical assistance, wound closure and monitoring patient's safety throughout the case.      ANESTHESIA  General with Block      FINDINGS:  Comminuted significant displacement patellar fracture with retinacular disruption both medial and lateral with moderate to advanced degenerative joint disease of the patellofemoral compartment    SPECIMENS:  none    ESTIMATED BLOOD LOSS:  100 cc    COMPLICATIONS   None.         INDICATION FOR PROCEDURE  Kyle Lopez is a 80 year old male who is otherwise relatively healthy presents baseline difficulties with his bilateral knee osteoarthritis degenerative joint disease left worse than right (walks only short distances without pain which has been managed by Dr. Lema at [facility] with injections and conservative therapy) who unfortunately fell suffering a left patella fracture as well as left ankle fracture including proximal fibular fracture.  He did have workup and admission from the emergency department with hospital medicine who otherwise optimized his baseline health for the operating room.  We did have a thorough discussion of the risk benefits alternatives and details of his injury including conservative nonoperative management as well as operative management.  Kyle did elect for operative intervention and we did discuss risk that would include infection neurovascular damage stiffness need for repeat surgery screw cut out or hardware complications mall or nonunion DVT blood clot instability fracture dislocation need for repeat surgery loss of limb anesthesia complications cardiac arrest stroke very rarely death and he does understand and wishes to proceed with surgery..    PREOPERATIVE EXAMINATION:   Significant knee effusion with pain to the patella and surrounding ecchymosis and knee swelling, no significant instability with varus or valgus stress and knee range of motion deferred as palpable deformity appreciated but otherwise is neurovascular intact diffusely to left lower extremity including to foot/toes.  He did have pain and tenderness to palpation over the lateral fibula no significant pain to the medial malleolus and please see Dr. Bach and previous consult note for complete catalog of history.    INFORMED CONSENT  Kyle Lopez was identified in the preoperative holding area and was identified using medical record number, name, and date of birth, all of which were  confirmed. The operative extremity was marked using an indelible marker. Once again, all risks and benefits as well as alternatives to surgical intervention were discussed with the patient in detail and all their questions were answered. Risks discussed included but were not limited to:  persistent pain, infection, bleeding, scarring, stiffness, thromboembolic events, fracture, malalignment/malrotation, malunion/nonunion, implant complications, severe limb dysfunction, loss of limb, and loss of life. The patient signed informed consent and wished to proceed with surgery as scheduled.     DESCRIPTION OF PROCEDURE   Kyle Lopez was brought back to the operating room.   Anesthesia was achieved without difficulty and a block was placed please see anesthesia for those notes.  The patient was then transferred to the OR table.  All bony prominences were well-padded. The patient was then prepped and draped in the usual sterile fashion.    A timeout was performed prior to the procedure.  Three separate staff members confirmed the patient's name, correct site and side of surgery and procedure being performed.  Antibiotics were confirmed to be given prior to incision.     Note, please see Dr. Jean's note for a detailed description of the left ankle fracture surgery of ORIF.    A midline knee incision was carried out over the anterior aspect of the knee with a 10 blade scalpel down to the retinacular tissue with careful dissection of a Bovie electrocautery and the assistance of Metzenbaum scissors.  We did readily encountered hematoma and edematous suprapatellar bursal tissue which was excised with the assistance of a rongeur.  After copious irrigation and cleaning we did readily note the significant comminution and displaced fracture margin of the patella.  We did take careful note to note that the quadriceps tendon as well as the patellar tendon were intact without significant disruption.  We also were able to visualize  the degenerative joint disease changes which have been well catalog and his arthroplasty visits clinically prior to this injury, but were visualized grossly with cartilage wear grade 3 - 4 on the trochlea as well as grade 3 - 4 on the patella.     We were able to first clean out the fracture margin with distance of a curette as well as a [place] and copious irrigation to debride fracture hematoma.  We then were able to appreciate a comminuted but large middle fragment which was rotated as well as displaced but were able to approximate back to the inferior pole of the patella fragment with a leg screw mechanism.  Using the [employer] system we were able to drill after reducing this and clamping it with fracture reduction forceps utilizing leg by technique with a 2.0 mm drill as well as a 1.5 mm drill to place a 2.0 cortical screw for leg fixation.  We did visualize this under fluoroscopy and appreciated satisfactory reduction.  We then were able to approximate the 2 large fracture fragments together and on gross appearance had near anatomic fixation with a nice cortical fit.  With a clamp in place we were able to visualize our reduction with the fluoroscopy imaging and despite some mild bone loss and comminution, did have the large fragment and articular reduction well-approximated.  Inferior pole with some osteophyte formation is appreciated and thus we placed the [employer] VA locking anterior patellar 3-hole plate standard size, it did rest on these osteophyte which has not easily visualized on fluoroscopy as an osteo opaque image, however, gross appearance was able to be approximated down well.  We then systematically placed cortical screws through the distal of the proximal and parallel with the joint to 7 screws.  This was done in leg technique with a 2.7 mm drill bit for the gliding hole and a 2.0 mm drill bit for the threaded hole in order to place these 2.7 mm cortical screws for compression.  We did visualize  this under fluoroscopy and we repeated this process 2 additional times for 3 longitudinal cortical screws in a compression leg technique fashion under fluoroscopy.    Next we proceeded to place the locking screws on the proximal fragment for screw fixation.  We did use a depth gauge and careful not to penetrate into the articular margin of the patella.  We did have adequate fixation of this patellar plate.  After copious irrigation we did take final fluoroscopy imaging as well as a live imaging and were able to feel with the [place] to ensure that there is no significant cortical screw disruption or penetration into the joint itself.  Again copious irrigation was performed and closure of the retinacular was with interrupted 0 Vicryl to good effect in both the medial as well as lateral margins.  We then subsequently closed the subcuticular with a layered fashion of 0 Vicryl suture followed by 2-0 Vicryl suture followed by 3-0 nylon.  We then placed a Mepilex dressing over top with a Ace wrap incorporated into the ACE that was utilized for the ankle fracture splint.    The patient was awakened from anesthesia without any untoward events.  Procedure well-tolerated we did discuss this case with his wife Anahy who is very appreciative and all questions were answered by the end the conversation.    POSTOPERATIVE PLAN:  Nonweightbearing left lower extremity, ankle splint in place as well as knee brace locked in extension, crutches/walker utilized for assistance  VTE prophylaxis with 81 mg of aspirin twice daily for 30 days  Multimodal pain control limiting narcotics as able  Dressing to remain in place until follow-up visit at 2 weeks (Mepilex over patella knee with Ace wrap, Xeroform followed by 4 x 4 gauze, Webril, short leg splint)  Postoperative radiographs of the left knee and left ankle  PT and OT and social work for evaluation and continued assistance    Follow-up with Kam Walker ~2 weeks at [facility] for  Left knee XR and Left ankle Xrs and evaluation.     Contact information provided to wife.      Jesu Walker MD  [facility]

## 2025-03-06 NOTE — ED TRIAGE NOTES
Patient brought in by EMS from home due to left knee injury. Patient was taking out their trash and slipped on kristine and fell on his left knee. Did not hit his head, no LOC, not on blood thinners. CNS is intact. Tylenol 650mg was given by EMS.

## 2025-03-07 ENCOUNTER — APPOINTMENT (OUTPATIENT)
Dept: OCCUPATIONAL THERAPY | Facility: HOSPITAL | Age: 80
DRG: 494 | End: 2025-03-07
Attending: STUDENT IN AN ORGANIZED HEALTH CARE EDUCATION/TRAINING PROGRAM
Payer: COMMERCIAL

## 2025-03-07 ENCOUNTER — APPOINTMENT (OUTPATIENT)
Dept: RADIOLOGY | Facility: HOSPITAL | Age: 80
DRG: 494 | End: 2025-03-07
Attending: PHYSICIAN ASSISTANT
Payer: COMMERCIAL

## 2025-03-07 ENCOUNTER — APPOINTMENT (OUTPATIENT)
Dept: CARDIOLOGY | Facility: HOSPITAL | Age: 80
DRG: 494 | End: 2025-03-07
Attending: STUDENT IN AN ORGANIZED HEALTH CARE EDUCATION/TRAINING PROGRAM
Payer: COMMERCIAL

## 2025-03-07 ENCOUNTER — APPOINTMENT (OUTPATIENT)
Dept: PHYSICAL THERAPY | Facility: HOSPITAL | Age: 80
DRG: 494 | End: 2025-03-07
Attending: STUDENT IN AN ORGANIZED HEALTH CARE EDUCATION/TRAINING PROGRAM
Payer: COMMERCIAL

## 2025-03-07 LAB
ANION GAP SERPL CALCULATED.3IONS-SCNC: 8 MMOL/L (ref 7–15)
ATRIAL RATE - MUSE: 83 BPM
BUN SERPL-MCNC: 16.1 MG/DL (ref 8–23)
CALCIUM SERPL-MCNC: 8.8 MG/DL (ref 8.8–10.4)
CHLORIDE SERPL-SCNC: 103 MMOL/L (ref 98–107)
CREAT SERPL-MCNC: 0.98 MG/DL (ref 0.67–1.17)
DIASTOLIC BLOOD PRESSURE - MUSE: 79 MMHG
EGFRCR SERPLBLD CKD-EPI 2021: 78 ML/MIN/1.73M2
ERYTHROCYTE [DISTWIDTH] IN BLOOD BY AUTOMATED COUNT: 13.3 % (ref 10–15)
GLUCOSE BLDC GLUCOMTR-MCNC: 107 MG/DL (ref 70–99)
GLUCOSE SERPL-MCNC: 116 MG/DL (ref 70–99)
HCO3 SERPL-SCNC: 27 MMOL/L (ref 22–29)
HCT VFR BLD AUTO: 41.8 % (ref 40–53)
HGB BLD-MCNC: 13.6 G/DL (ref 13.3–17.7)
HGB BLD-MCNC: 13.6 G/DL (ref 13.3–17.7)
HOLD SPECIMEN: NORMAL
INTERPRETATION ECG - MUSE: NORMAL
LVEF ECHO: NORMAL
MCH RBC QN AUTO: 30.4 PG (ref 26.5–33)
MCHC RBC AUTO-ENTMCNC: 32.5 G/DL (ref 31.5–36.5)
MCV RBC AUTO: 94 FL (ref 78–100)
P AXIS - MUSE: NORMAL DEGREES
PLATELET # BLD AUTO: 168 10E3/UL (ref 150–450)
POTASSIUM SERPL-SCNC: 5 MMOL/L (ref 3.4–5.3)
PR INTERVAL - MUSE: NORMAL MS
QRS DURATION - MUSE: 98 MS
QT - MUSE: 424 MS
QTC - MUSE: 479 MS
R AXIS - MUSE: 41 DEGREES
RBC # BLD AUTO: 4.47 10E6/UL (ref 4.4–5.9)
SODIUM SERPL-SCNC: 138 MMOL/L (ref 135–145)
SYSTOLIC BLOOD PRESSURE - MUSE: 171 MMHG
T AXIS - MUSE: 13 DEGREES
VENTRICULAR RATE- MUSE: 77 BPM
WBC # BLD AUTO: 11.5 10E3/UL (ref 4–11)

## 2025-03-07 PROCEDURE — C8929 TTE W OR WO FOL WCON,DOPPLER: HCPCS

## 2025-03-07 PROCEDURE — 73560 X-RAY EXAM OF KNEE 1 OR 2: CPT | Mod: LT

## 2025-03-07 PROCEDURE — 999N000208 ECHOCARDIOGRAM COMPLETE

## 2025-03-07 PROCEDURE — 85018 HEMOGLOBIN: CPT | Performed by: PHYSICIAN ASSISTANT

## 2025-03-07 PROCEDURE — 93306 TTE W/DOPPLER COMPLETE: CPT | Mod: 26 | Performed by: INTERNAL MEDICINE

## 2025-03-07 PROCEDURE — 80048 BASIC METABOLIC PNL TOTAL CA: CPT | Performed by: STUDENT IN AN ORGANIZED HEALTH CARE EDUCATION/TRAINING PROGRAM

## 2025-03-07 PROCEDURE — 99232 SBSQ HOSP IP/OBS MODERATE 35: CPT | Performed by: STUDENT IN AN ORGANIZED HEALTH CARE EDUCATION/TRAINING PROGRAM

## 2025-03-07 PROCEDURE — 250N000013 HC RX MED GY IP 250 OP 250 PS 637: Performed by: STUDENT IN AN ORGANIZED HEALTH CARE EDUCATION/TRAINING PROGRAM

## 2025-03-07 PROCEDURE — 255N000002 HC RX 255 OP 636: Performed by: STUDENT IN AN ORGANIZED HEALTH CARE EDUCATION/TRAINING PROGRAM

## 2025-03-07 PROCEDURE — 97165 OT EVAL LOW COMPLEX 30 MIN: CPT | Mod: GO

## 2025-03-07 PROCEDURE — 250N000011 HC RX IP 250 OP 636: Performed by: PHYSICIAN ASSISTANT

## 2025-03-07 PROCEDURE — 97530 THERAPEUTIC ACTIVITIES: CPT | Mod: GP | Performed by: PHYSICAL THERAPIST

## 2025-03-07 PROCEDURE — 97162 PT EVAL MOD COMPLEX 30 MIN: CPT | Mod: GP | Performed by: PHYSICAL THERAPIST

## 2025-03-07 PROCEDURE — 73610 X-RAY EXAM OF ANKLE: CPT | Mod: LT

## 2025-03-07 PROCEDURE — 97535 SELF CARE MNGMENT TRAINING: CPT | Mod: GO

## 2025-03-07 PROCEDURE — 36415 COLL VENOUS BLD VENIPUNCTURE: CPT | Performed by: PHYSICIAN ASSISTANT

## 2025-03-07 PROCEDURE — 250N000013 HC RX MED GY IP 250 OP 250 PS 637: Performed by: PHYSICIAN ASSISTANT

## 2025-03-07 PROCEDURE — 120N000001 HC R&B MED SURG/OB

## 2025-03-07 PROCEDURE — 85014 HEMATOCRIT: CPT | Performed by: STUDENT IN AN ORGANIZED HEALTH CARE EDUCATION/TRAINING PROGRAM

## 2025-03-07 RX ADMIN — THIAMINE HCL TAB 100 MG 100 MG: 100 TAB at 08:07

## 2025-03-07 RX ADMIN — SENNOSIDES AND DOCUSATE SODIUM 1 TABLET: 50; 8.6 TABLET ORAL at 20:22

## 2025-03-07 RX ADMIN — ASPIRIN 81 MG: 81 TABLET, COATED ORAL at 20:22

## 2025-03-07 RX ADMIN — CEFAZOLIN SODIUM 2 G: 2 SOLUTION INTRAVENOUS at 05:03

## 2025-03-07 RX ADMIN — ASPIRIN 81 MG: 81 TABLET, COATED ORAL at 08:07

## 2025-03-07 RX ADMIN — OXYCODONE HYDROCHLORIDE 5 MG: 5 TABLET ORAL at 20:21

## 2025-03-07 RX ADMIN — PERFLUTREN 4 ML: 6.52 INJECTION, SUSPENSION INTRAVENOUS at 14:32

## 2025-03-07 RX ADMIN — FOLIC ACID 1 MG: 1 TABLET ORAL at 08:07

## 2025-03-07 RX ADMIN — ACETAMINOPHEN 650 MG: 325 TABLET ORAL at 16:26

## 2025-03-07 RX ADMIN — ACETAMINOPHEN 975 MG: 325 TABLET ORAL at 11:55

## 2025-03-07 RX ADMIN — Medication 1 TABLET: at 08:07

## 2025-03-07 RX ADMIN — OXYCODONE HYDROCHLORIDE 10 MG: 5 TABLET ORAL at 09:09

## 2025-03-07 RX ADMIN — OXYCODONE HYDROCHLORIDE 10 MG: 5 TABLET ORAL at 14:52

## 2025-03-07 RX ADMIN — SENNOSIDES AND DOCUSATE SODIUM 1 TABLET: 50; 8.6 TABLET ORAL at 08:07

## 2025-03-07 RX ADMIN — POLYETHYLENE GLYCOL 3350 17 G: 17 POWDER, FOR SOLUTION ORAL at 08:07

## 2025-03-07 RX ADMIN — ACETAMINOPHEN 975 MG: 325 TABLET ORAL at 05:01

## 2025-03-07 RX ADMIN — ACETAMINOPHEN 975 MG: 325 TABLET ORAL at 20:21

## 2025-03-07 ASSESSMENT — ACTIVITIES OF DAILY LIVING (ADL)
ADLS_ACUITY_SCORE: 37
ADLS_ACUITY_SCORE: 41
ADLS_ACUITY_SCORE: 42
ADLS_ACUITY_SCORE: 37
ADLS_ACUITY_SCORE: 42
ADLS_ACUITY_SCORE: 41
ADLS_ACUITY_SCORE: 37
ADLS_ACUITY_SCORE: 41
ADLS_ACUITY_SCORE: 37
ADLS_ACUITY_SCORE: 41

## 2025-03-07 NOTE — PLAN OF CARE
Problem: Delirium  Goal: Improved Attention and Thought Clarity  Outcome: Progressing     Problem: Alcohol Withdrawal  Goal: Alcohol Withdrawal Symptom Control  Outcome: Progressing     Problem: Orthopaedic Fracture  Goal: Effective Oxygenation and Ventilation  Outcome: Progressing   Goal Outcome Evaluation:       Pt presented to the hospital on 3/05 after falling on the ice and sustaining fractures to the LLE. Today underwent ORIF of the left patella and left ankle. Splint intact to LLE from foot to thigh. Clean, dry and intact. Pt is nonweightbearing with hinged immobilizer in place. LLE elevated on pillows. CMS intact to affected extremity. Received scheduled Tylenol for pain management. Pt had some delirium present when coming out of surgery however, is now alert and oriented x4. Able to answer all questions appropriately with good recall. 1:1 at bedside for safety will be removed. Weaned to RA and maintaining O2 sats >92%. On continuous pulse ox. Receiving postop IV abx in the form of Ancef. Pt does have a hx of alcohol abuse. CIWA score of 3 post op. Pt is tolerating clear liquids. Using urinal at bedside. PVR 70.

## 2025-03-07 NOTE — PROGRESS NOTES
03/07/25 1104   Appointment Info   Signing Clinician's Name / Credentials (OT) Nupur LABOY kedar Mullerdorothea RODRIGUEZTR/L   Living Environment   People in Home spouse   Current Living Arrangements house   Home Accessibility stairs to enter home   Number of Stairs, Main Entrance 3   Transportation Anticipated family or friend will provide   Self-Care   Current Activity Tolerance moderate   Equipment Currently Used at Home none   Activity/Exercise/Self-Care Comment Pt is independent at baseline, works part time   General Information   Onset of Illness/Injury or Date of Surgery 03/05/25   Referring Physician Andres   Patient/Family Therapy Goal Statement (OT) cruise in July   Additional Occupational Profile Info/Pertinent History of Current Problem Pt admitted s/p fall on ice with multiple L LE fxs.   Existing Precautions/Restrictions fall;weight bearing;other (see comments)  (KI at all times)   Left Lower Extremity (Weight-bearing Status) non weight-bearing (NWB)   Cognitive Status Examination   Cognitive Status Comments Pt is able to give historoy.  Needs very specific directions but then follows well.   Visual Perception   Visual Impairment/Limitations WFL   Sensory   Sensory Comments IE intact   Range of Motion Comprehensive   General Range of Motion no range of motion deficits identified   Strength Comprehensive (MMT)   Comment, General Manual Muscle Testing (MMT) Assessment WFL for ADLs   Coordination   Upper Extremity Coordination No deficits were identified   Bed Mobility   Comment (Bed Mobility) Min A supine to sit   Transfers   Transfer Comments Min A   Balance   Balance Comments Min A for static stand with FWW   Activities of Daily Living   BADL Assessment/Intervention lower body dressing   Lower Body Dressing Assessment/Training   Comment, (Lower Body Dressing) Min A with AE   Clinical Impression   Criteria for Skilled Therapeutic Interventions Met (OT) Yes, treatment indicated   OT Diagnosis Impaired ADL  independence   OT Problem List-Impairments impacting ADL balance;mobility;pain   Assessment of Occupational Performance 1-3 Performance Deficits   Planned Therapy Interventions (OT) ADL retraining;balance training;bed mobility training;strengthening;transfer training   Clinical Decision Making Complexity (OT) problem focused assessment/low complexity   Risk & Benefits of therapy have been explained evaluation/treatment results reviewed;participants included;patient;spouse/significant other   Clinical Impression Comments (S)  Pt seen bedside for OT eval and treatment.  Pt demonstrates decreased independence with ADLs and mobility due to recent injury.  OT to continue to address.  Recommend TCU at discharge to maximize I iwth ADLs and mobility, decrease risk of falls.   OT Total Evaluation Time   OT Eval, Low Complexity Minutes (31314) 10   OT Goals   Therapy Frequency (OT) Daily   OT Predicted Duration/Target Date for Goal Attainment 03/14/25   OT Goals Lower Body Dressing;Bed Mobility;Transfers;OT Goal 1   OT: Lower Body Dressing Modified independent   OT: Bed Mobility Modified independent   OT: Transfer Modified independent   OT: Goal 1 Pt will tolerate 10 minutes UE strengthening ex to increase abitliy to maintain NWB wtih mobility.   OT Discharge Planning   OT Plan bed mobility with leg , review dressing, commode transfer   OT Discharge Recommendation (DC Rec) Transitional Care Facility   OT Rationale for DC Rec TCU recommended as pt needs further therapy to manage NWB with mobility.  Pt currently needs hands on assist with all ADLs and mobility   OT Brief overview of current status Min A STS and LB dressing iwth AE

## 2025-03-07 NOTE — PROGRESS NOTES
Orthopedic Progress Note      Assessment: 1 Day Post-Op  S/P Procedure(s):  OPEN REDUCTION INTERNAL FIXATION, FRACTURE, PATELLA  OPEN REDUCTION INTERNAL FIXATION, FRACTURE, ANKLE     Plan:   - Continue PT/OT  - Weightbearing status: NWB LLE  - Activity: Up with assist and assistive device until independent.  - Anticoagulation: ASA 81 PO BID in addition to SCDs, demarcus stockings and early ambulation.  - Antibiotics: 24 hours periop  - Pain Management; continue current regimen  - Diet: progress diet as tolerated  - Labs: hgb 13.6, transfuse if <7.0. No indication today  - Dressing: Keep dry and intact.  Splint to remain clean, dry, and intact  - Knee immobilizer on at all times locked in extension  - Elevation: Elevate LLE on pillow to keep above the level of the heart as much as possible  - Follow-up: Outpatient follow up in 2 weeks  - Disposition: Anticipate discharge TCU vs home pending progress with therapy.      Subjective:  Pain: moderate  Nausea, Vomiting:  No  Lightheadedness, Dizziness:  No  Neuro:  Patient denies new onset numbness or paresthesias  Fever, chills: No  Chest pain: No  SOB: No    Patient reports feeling well today. Patient reports pain is tolerable with current pain regimen. Patient eating and drinking well. Patient voiding and passing gas however no BM. All questions/concerns answered.      Objective:  /67 (BP Location: Left arm, Patient Position: Semi-Lamb's)   Pulse 79   Temp 98.1  F (36.7  C) (Oral)   Resp 18   Wt 95.3 kg (210 lb)   SpO2 96%     The patient is A&Ox3. Appears comfortable, sitting up in bed  Calves without tenderness, neg Luciana's  Brisk capillary refill in the toes.   Palpable Left dorsalis pedis pulse. Left foot warm & well-perfused.  Sensation is intact to light touch & equal bilaterally in the femoral, DP, SP & tibial nerve distributions.  ROM: Appropriately flexes & extends all toes bilaterally. .   Leg lengths equal.  Dressing C/D/I without strikethrough, no  surrounding erythema.  Splint C/D/I  Knee immobilizer in place      No drain     Pertinent Labs   Lab Results: personally reviewed.   Lab Results   Component Value Date    INR 1.02 03/05/2025     Lab Results   Component Value Date    WBC 11.5 (H) 03/07/2025    HGB 13.6 03/07/2025    HGB 13.6 03/07/2025    HCT 41.8 03/07/2025    MCV 94 03/07/2025     03/07/2025     Lab Results   Component Value Date     03/07/2025    CO2 27 03/07/2025         Report completed by:  SUNNY CABEZAS PA-C  Date: 03/07/2025  [facility]

## 2025-03-07 NOTE — PLAN OF CARE
Problem: Adult Inpatient Plan of Care  Goal: Absence of Hospital-Acquired Illness or Injury  Outcome: Progressing  Intervention: Identify and Manage Fall Risk  Recent Flowsheet Documentation  Taken 3/7/2025 0011 by Cate Rebolledo RN  Safety Promotion/Fall Prevention:   safety round/check completed   activity supervised   clutter free environment maintained   lighting adjusted   patient and family education  Intervention: Prevent Skin Injury  Recent Flowsheet Documentation  Taken 3/7/2025 0255 by Cate Rebolledo RN  Body Position:   legs elevated   position maintained  Taken 3/7/2025 0011 by Cate Rebolledo RN  Body Position:   supine, head elevated   legs elevated  Intervention: Prevent and Manage VTE (Venous Thromboembolism) Risk  Recent Flowsheet Documentation  Taken 3/7/2025 0011 by Cate Rebolledo RN  VTE Prevention/Management: (right lower extremity) SCDs on (sequential compression devices)  Goal: Optimal Comfort and Wellbeing  Outcome: Progressing     Problem: Pain Acute  Goal: Optimal Pain Control and Function  Outcome: Progressing  Intervention: Prevent or Manage Pain  Recent Flowsheet Documentation  Taken 3/7/2025 0011 by Cate Rebolledo RN  Medication Review/Management: high-risk medications identified     Problem: Fall Injury Risk  Goal: Absence of Fall and Fall-Related Injury  Outcome: Progressing  Intervention: Identify and Manage Contributors  Recent Flowsheet Documentation  Taken 3/7/2025 0011 by Cate Rebolledo RN  Medication Review/Management: high-risk medications identified  Intervention: Promote Injury-Free Environment  Recent Flowsheet Documentation  Taken 3/7/2025 0011 by Cate Rebolledo RN  Safety Promotion/Fall Prevention:   safety round/check completed   activity supervised   clutter free environment maintained   lighting adjusted   patient and family education     Problem: Mobility Impairment  Goal: Optimal Mobility  Intervention: Optimize Mobility  Recent Flowsheet Documentation  Taken 3/7/2025 0255 by Cate Rebolledo  RN  Positioning/Transfer Devices:   pillows   in use  Taken 3/7/2025 0011 by Cate Rebolledo RN  Positioning/Transfer Devices:   pillows   in use     Problem: Orthopaedic Fracture  Goal: Absence of Bleeding  Outcome: Progressing  Intervention: Monitor and Manage Fracture Bleeding  Recent Flowsheet Documentation  Taken 3/7/2025 0011 by Cate Rebolledo RN  Fracture Immobilization: immobilization device maintained  Bleeding Management:   affected area elevated   dressing monitored     Problem: Orthopaedic Fracture  Goal: Optimal Pain Control and Function  Outcome: Progressing     Problem: Orthopaedic Fracture  Goal: Effective Oxygenation and Ventilation  Outcome: Progressing  Intervention: Promote Airway Secretion Clearance  Recent Flowsheet Documentation  Taken 3/7/2025 0011 by Ctae Rebolledo RN  Cough And Deep Breathing: done independently per patient  Intervention: Optimize Oxygenation and Ventilation  Recent Flowsheet Documentation  Taken 3/7/2025 0255 by Cate Rebolledo RN  Head of Bed (HOB) Positioning: HOB at 20 degrees  Taken 3/7/2025 0011 by Cate Rebolledo RN  Head of Bed (HOB) Positioning: HOB at 15 degrees     Problem: Alcohol Withdrawal  Goal: Alcohol Withdrawal Symptom Control  Outcome: Progressing     Problem: Excessive Substance Use  Goal: Improved Behavioral Control (Excessive Substance Use)  Outcome: Progressing     Problem: Delirium  Goal: Improved Attention and Thought Clarity  Outcome: Progressing     Problem: Delirium  Goal: Improved Sleep  Outcome: Progressing     Goal Outcome Evaluation:  Pt is A&Ox4. Urinal at bedside. Encouraged PO fluid intake. NWB LLE. LLE dressing C/D/I w/ splint & immobilizer on. CIWA score is 0 at start of shift & score 0 at 0400. Denies pain on shift, pt is receiving schedule tylenol for pain. On IV ANCEF. Pt slept in between cares.

## 2025-03-07 NOTE — PROGRESS NOTES
"   03/07/25 0915   Appointment Info   Signing Clinician's Name / Credentials (PT) Noni Bianchi, PT, DPT   Living Environment   People in Home spouse   Current Living Arrangements house   Home Accessibility stairs to enter home   Number of Stairs, Main Entrance 3   Stair Railings, Main Entrance railings safe and in good condition;railing on right side (ascending)   Self-Care   Equipment Currently Used at Home none   Fall history within last six months yes   Number of times patient has fallen within last six months 1   Activity/Exercise/Self-Care Comment Patient typically independent and active at baseline.   General Information   Onset of Illness/Injury or Date of Surgery 03/05/25   Referring Physician Edin Campos PA-C   Patient/Family Therapy Goals Statement (PT) None stated.   Pertinent History of Current Problem (include personal factors and/or comorbidities that impact the POC) Per H&P: \"80 year old male with a no significant past medical history who was admitted on 3/5/25 after presenting to [facility] ED for Multiple Fractures of the Left Lower Extremity..\"   Existing Precautions/Restrictions fall   Weight-Bearing Status - LLE nonweight-bearing  (hinged knee brace locked in extension)   Pain Assessment   Patient Currently in Pain Yes, see Vital Sign flowsheet   Range of Motion (ROM)   Range of Motion ROM deficits secondary to surgical procedure   ROM Comment L knee immobilized in hinged knee brace   Strength (Manual Muscle Testing)   Strength (Manual Muscle Testing) Deficits observed during functional mobility   Bed Mobility   Bed Mobility supine-sit;sit-supine   Supine-Sit [place] (Bed Mobility) minimum assist (75% patient effort);verbal cues   Sit-Supine [place] (Bed Mobility) minimum assist (75% patient effort);moderate assist (50% patient effort);2 person assist   Bed Mobility Limitations decreased ability to use legs for bridging/pushing   Impairments Contributing to Impaired Bed Mobility " decreased strength;pain   Assistive Device (Bed Mobility) bed rails   Transfers   Transfers sit-stand transfer   Maintains Weight-bearing Status (Transfers) verbal cues to maintain   Transfer Safety Concerns Noted decreased weight-shifting ability   Impairments Contributing to Impaired Transfers impaired balance   Sit-Stand Transfer   Sit-Stand [place] (Transfers) moderate assist (50% patient effort)   Assistive Device (Sit-Stand Transfers) walker, front-wheeled   Gait/Stairs (Locomotion)   [place] Level (Gait) moderate assist (50% patient effort);verbal cues   Assistive Device (Gait) walker, front-wheeled   Distance in Feet (Gait) 2 side steps   Pattern (Gait) step-to   Clinical Impression   Criteria for Skilled Therapeutic Intervention Yes, treatment indicated   PT Diagnosis (PT) impaired functional mobility   Influenced by the following impairments decreased strength, impaired balance, NWB restriction   Functional limitations due to impairments transfers, ambulation, stairs   Clinical Presentation (PT Evaluation Complexity) evolving   Clinical Presentation Rationale Clinical judgment.   Clinical Decision Making (Complexity) moderate complexity   Planned Therapy Interventions (PT) balance training;bed mobility training;gait training;home exercise program;neuromuscular re-education;patient/family education;strengthening;transfer training;stair training;wheelchair management/propulsion training   Risk & Benefits of therapy have been explained evaluation/treatment results reviewed;participants voiced agreement with care plan;participants included;patient   PT Total Evaluation Time   PT Eval, Moderate Complexity Minutes (48644) 10   Physical Therapy Goals   PT Frequency Daily   PT Predicted Duration/Target Date for Goal Attainment 03/14/25   PT Goals Bed Mobility;Transfers;Gait;Stairs;Wheelchair Mobility   PT: Bed Mobility Supervision/stand-by assist;Supine to/from sit;Within precautions   PT: Transfers  Supervision/stand-by assist;Sit to/from stand;Assistive device;Within precautions   PT: Gait Supervision/stand-by assist;Rolling walker;10 feet;Within precautions   PT: Stairs Minimal assist;3 stairs;Rail on both sides   PT: Wheelchair Mobility 50 feet;manual wheelchair;Caregiver SBA   Interventions   Interventions Quick Adds Therapeutic Activity   Therapeutic Activity   Therapeutic Activities: dynamic activities to improve functional performance Minutes (13006) 10   Symptoms Noted During/After Treatment Fatigue   Treatment Detail/Skilled Intervention Sit <> stand x3 reps from EOB. Verbal cues for NWB L LE. Due to locked hinge knee brace, verbal cues for foot placement. Pt able to pivot on L foot to advance to take steps toward HOB. Discussed stair management with pt and spouse, spouse asking about using a ramp. Discussed this would be an ideal option if able.   PT Discharge Planning   PT Plan transfers NWB, w/c mobility, stair management   PT Discharge Recommendation (DC Rec) Transitional Care Facility   PT Rationale for DC Rec Pt requiring assist of 1 for transfers and is not yet tolerating ambulation. Pt has 3 stairs to enter home. Recommend TCU as safest option at discharge. If patient declines TCU, recommend 24 hour supervision/assist, hands on assist of 1 for transfers, wheelchair for longer distances, commode, home PT, stretcher for transfer into home.   PT Brief overview of current status Sit < >stand with FWW, min to mod assist of 1.   PT Total Distance Amb During Session (feet) 2   PT Equipment Needed at Discharge walker, rolling;wheelchair   Physical Therapy Time and Intention   Timed Code Treatment Minutes 10   Total Session Time (sum of timed and untimed services) 20

## 2025-03-07 NOTE — PLAN OF CARE
Problem: Adult Inpatient Plan of Care  Goal: Plan of Care Review  Description: The Plan of Care Review/Shift note should be completed every shift.  The Outcome Evaluation is a brief statement about your assessment that the patient is improving, declining, or no change.  This information will be displayed automatically on your shift  note.  Outcome: Progressing  Flowsheets (Taken 3/7/2025 1243)  Plan of Care Reviewed With:   patient   spouse   Goal Outcome Evaluation:      Plan of Care Reviewed With: patient, spouse      Patient alert and oriented x4. Able to communicate his needs. Surgical dressing dry and intact to LLE. Hinged immobilizer locked in extension. NWB LLE. CMS+  Was able to stand with therapy at bedside with assist of (2) using a walker. Pain controlled with scheduled tylenol and prn oxycodone. LLE elevated up on pillows at all times. Regular diet with good intake. Voiding clear yellow urine. Ortho following. Home with HC vs TCU at discharge.   Qing Parker RN

## 2025-03-07 NOTE — PROGRESS NOTES
Care Management Follow Up    Length of Stay (days): 2    Expected Discharge Date: 03/08/2025    Anticipated Discharge Plan:       Transportation: Anticipate Wheelchair. Transportation costs discussed? Yes. Discussed with pt and wife     PT Recommendations: Transitional Care Facility  OT Recommendations:  Transitional Care Facility     Barriers to Discharge: medical stability, placement    Prior Living Situation: house with spouse    Discussed  Partnership in Safe Discharge Planning  document with patient/family: No     Handoff Completed: No, handoff not indicated or clinically appropriate    Patient/Spokesperson Updated: Yes. Who? Pt and wife     Additional Information:  CM met with pt and wife in room to discuss discharge planning. Therapy rec is TCU. Pt is agreeable. CM provided TCU list to review. Pt would like [facility]errals [facility]. Referrals sent and pedning. Pt agreeable to private room charge.      Next Steps: placement    EMANUEL Simeon

## 2025-03-07 NOTE — PROGRESS NOTES
[facility]    Medicine Progress Note - Hospitalist Service    Date of Admission:  3/5/2025    Assessment & Plan   Kyle Lopez is a 80 year old male with a no significant past medical history who was admitted on 3/5/25 after presenting to [facility] ED for Multiple Fractures of the Left Lower Extremity..     Left Patella Fracture  Left Proximal Fibula Fracture  Left Distal Fibula Fracture  Left Medial Malleolus Fracture  - most likely mechanical fall  - s/p open reduction and internal fixation of left patella fracture and left ankle fracture old 3/6.  - Pain Regimen - Tylenol, oxycodone prn, dilaudid IV prn  - PT/OT consulted  - EKG with NSR  - echocardiogram was ordered   - post op management per ortho team     Mechanical Fall  - Patient with fall as above leading to multiple fractures of LLE.   - Fall sounds mechanical - slipped on ice - no dizziness/syncope  - Checking Echo  - PT/OT as above     Alcohol Use Disorder  - Patient reports drinking 2-3 drinks everyday  - EtOH level in the ED was 0.00  - MVI, folic acid, thiamine started on admission.  - scoring very low on CIWA                Diet: Advance Diet as Tolerated: Regular Diet Adult    DVT Prophylaxis: Pneumatic Compression Devices  Monteiro Catheter: Not present  Lines: None     Cardiac Monitoring: None  Code Status: Full Code      Clinically Significant Risk Factors                                         Social Drivers of Health    Tobacco Use: Medium Risk (2/2/2021)    Received from [employer]    Patient History     Smoking Tobacco Use: Former     Smokeless Tobacco Use: Unknown    Received from [employer]    Social Connections          Disposition Plan     Medically Ready for Discharge: Anticipated in 2-4 Days         Lisy Baker MD  Hospitalist Service  [facility]  Securely message with [employer] (more info)  Text page via [employer]  Paging/Directory   ______________________________________________________________________    Interval History   No distress noted.  He reports having pain on the procedure site 6 out of 10.  Management plan discussed with the patient and his family members were present at the bedside.    Physical Exam   Vital Signs: Temp: 98.1  F (36.7  C) Temp src: Oral BP: 135/67 Pulse: 79   Resp: 18 SpO2: 96 % O2 Device: None (Room air) Oxygen Delivery: 1 LPM  Weight: 210 lbs 0 oz    General Appearance: No distress noted  Respiratory: Good air entry bilaterally  Cardiovascular: S1 and S2 well heard, no murmur or gallop  GI: Soft abdomen, no tenderness, normoactive bowel sound  Skin: Intact and warm    Medical Decision Making       40 MINUTES SPENT BY ME on the date of service doing chart review, history, exam, documentation & further activities per the note.      Data

## 2025-03-08 ENCOUNTER — APPOINTMENT (OUTPATIENT)
Dept: OCCUPATIONAL THERAPY | Facility: HOSPITAL | Age: 80
DRG: 494 | End: 2025-03-08
Payer: COMMERCIAL

## 2025-03-08 LAB
GLUCOSE BLDC GLUCOMTR-MCNC: 95 MG/DL (ref 70–99)
HGB BLD-MCNC: 13.1 G/DL (ref 13.3–17.7)

## 2025-03-08 PROCEDURE — 36415 COLL VENOUS BLD VENIPUNCTURE: CPT | Performed by: PHYSICIAN ASSISTANT

## 2025-03-08 PROCEDURE — 85018 HEMOGLOBIN: CPT | Performed by: PHYSICIAN ASSISTANT

## 2025-03-08 PROCEDURE — 97535 SELF CARE MNGMENT TRAINING: CPT | Mod: GO

## 2025-03-08 PROCEDURE — 250N000013 HC RX MED GY IP 250 OP 250 PS 637: Performed by: PHYSICIAN ASSISTANT

## 2025-03-08 PROCEDURE — 120N000001 HC R&B MED SURG/OB

## 2025-03-08 PROCEDURE — 99232 SBSQ HOSP IP/OBS MODERATE 35: CPT | Performed by: STUDENT IN AN ORGANIZED HEALTH CARE EDUCATION/TRAINING PROGRAM

## 2025-03-08 PROCEDURE — 250N000013 HC RX MED GY IP 250 OP 250 PS 637: Performed by: STUDENT IN AN ORGANIZED HEALTH CARE EDUCATION/TRAINING PROGRAM

## 2025-03-08 RX ADMIN — POLYETHYLENE GLYCOL 3350 17 G: 17 POWDER, FOR SOLUTION ORAL at 07:35

## 2025-03-08 RX ADMIN — OXYCODONE HYDROCHLORIDE 10 MG: 5 TABLET ORAL at 17:28

## 2025-03-08 RX ADMIN — ASPIRIN 81 MG: 81 TABLET, COATED ORAL at 20:24

## 2025-03-08 RX ADMIN — ACETAMINOPHEN 975 MG: 325 TABLET ORAL at 04:29

## 2025-03-08 RX ADMIN — OXYCODONE HYDROCHLORIDE 5 MG: 5 TABLET ORAL at 07:36

## 2025-03-08 RX ADMIN — SENNOSIDES AND DOCUSATE SODIUM 1 TABLET: 50; 8.6 TABLET ORAL at 07:36

## 2025-03-08 RX ADMIN — OXYCODONE HYDROCHLORIDE 10 MG: 5 TABLET ORAL at 12:51

## 2025-03-08 RX ADMIN — ACETAMINOPHEN 975 MG: 325 TABLET ORAL at 12:11

## 2025-03-08 RX ADMIN — SENNOSIDES AND DOCUSATE SODIUM 2 TABLET: 50; 8.6 TABLET ORAL at 12:18

## 2025-03-08 RX ADMIN — ASPIRIN 81 MG: 81 TABLET, COATED ORAL at 07:36

## 2025-03-08 RX ADMIN — ACETAMINOPHEN 975 MG: 325 TABLET ORAL at 20:24

## 2025-03-08 RX ADMIN — THIAMINE HCL TAB 100 MG 100 MG: 100 TAB at 07:36

## 2025-03-08 RX ADMIN — MAGNESIUM HYDROXIDE 30 ML: 400 SUSPENSION ORAL at 12:18

## 2025-03-08 RX ADMIN — Medication 1 TABLET: at 07:36

## 2025-03-08 RX ADMIN — FOLIC ACID 1 MG: 1 TABLET ORAL at 07:36

## 2025-03-08 RX ADMIN — SENNOSIDES AND DOCUSATE SODIUM 1 TABLET: 50; 8.6 TABLET ORAL at 20:24

## 2025-03-08 ASSESSMENT — ACTIVITIES OF DAILY LIVING (ADL)
ADLS_ACUITY_SCORE: 41
ADLS_ACUITY_SCORE: 47
ADLS_ACUITY_SCORE: 41
ADLS_ACUITY_SCORE: 47
ADLS_ACUITY_SCORE: 41
ADLS_ACUITY_SCORE: 47
ADLS_ACUITY_SCORE: 41
ADLS_ACUITY_SCORE: 47
ADLS_ACUITY_SCORE: 47
ADLS_ACUITY_SCORE: 41
ADLS_ACUITY_SCORE: 41
ADLS_ACUITY_SCORE: 47
ADLS_ACUITY_SCORE: 41

## 2025-03-08 NOTE — PLAN OF CARE
Goal Outcome Evaluation: pt up in the chair with two assist, ate dinner on chair, tylenol given for pain of 6/10 on leg. Pt transferred back to bed at 1830, wife at the bedside.

## 2025-03-08 NOTE — PLAN OF CARE
Problem: Adult Inpatient Plan of Care  Goal: Absence of Hospital-Acquired Illness or Injury  Outcome: Progressing  Intervention: Identify and Manage Fall Risk  Recent Flowsheet Documentation  Taken 3/8/2025 0045 by Cate Rebolledo RN  Safety Promotion/Fall Prevention:   safety round/check completed   activity supervised   clutter free environment maintained   lighting adjusted   patient and family education  Intervention: Prevent Skin Injury  Recent Flowsheet Documentation  Taken 3/8/2025 0255 by Cate Rebolledo RN  Body Position:   position changed independently   legs elevated  Taken 3/8/2025 0055 by Cate Rebolledo RN  Body Position:   position changed independently   legs elevated  Taken 3/8/2025 0045 by Cate Rebolledo RN  Body Position:   supine, head elevated   legs elevated  Intervention: Prevent Infection  Recent Flowsheet Documentation  Taken 3/8/2025 0045 by Cate Rebolledo RN  Infection Prevention: rest/sleep promoted  Goal: Optimal Comfort and Wellbeing  Outcome: Progressing     Problem: Pain Acute  Goal: Optimal Pain Control and Function  Outcome: Progressing  Intervention: Prevent or Manage Pain  Recent Flowsheet Documentation  Taken 3/8/2025 0045 by Cate Rebolledo RN  Medication Review/Management: high-risk medications identified     Problem: Fall Injury Risk  Goal: Absence of Fall and Fall-Related Injury  Outcome: Progressing  Intervention: Identify and Manage Contributors  Recent Flowsheet Documentation  Taken 3/8/2025 0045 by Cate Rebolledo RN  Medication Review/Management: high-risk medications identified  Intervention: Promote Injury-Free Environment  Recent Flowsheet Documentation  Taken 3/8/2025 0045 by Cate Rebolledo RN  Safety Promotion/Fall Prevention:   safety round/check completed   activity supervised   clutter free environment maintained   lighting adjusted   patient and family education     Problem: Mobility Impairment  Goal: Optimal Mobility  Outcome: Progressing  Intervention: Optimize Mobility  Recent Flowsheet  Documentation  Taken 3/8/2025 0255 by Cate Rebolledo RN  Positioning/Transfer Devices:   pillows   in use  Taken 3/8/2025 0055 by Cate Rebolledo RN  Positioning/Transfer Devices:   pillows   in use  Taken 3/8/2025 0045 by Cate Rebolledo RN  Activity Management: activity adjusted per tolerance  Positioning/Transfer Devices:   pillows   in use     Problem: Orthopaedic Fracture  Goal: Absence of Bleeding  Outcome: Progressing  Intervention: Monitor and Manage Fracture Bleeding  Recent Flowsheet Documentation  Taken 3/8/2025 0045 by Cate Rebolledo RN  Fracture Immobilization: immobilization device maintained  Bleeding Management:   affected area elevated   dressing monitored     Problem: Orthopaedic Fracture  Goal: Absence of Infection Signs and Symptoms  Outcome: Progressing     Problem: Orthopaedic Fracture  Goal: Effective Tissue Perfusion  Outcome: Progressing     Problem: Orthopaedic Fracture  Goal: Effective Oxygenation and Ventilation  Outcome: Progressing  Intervention: Promote Airway Secretion Clearance  Recent Flowsheet Documentation  Taken 3/8/2025 0045 by Cate Rebolledo RN  Cough And Deep Breathing: done independently per patient  Activity Management: activity adjusted per tolerance  Intervention: Optimize Oxygenation and Ventilation  Recent Flowsheet Documentation  Taken 3/8/2025 0255 by Cate Rebolledo RN  Head of Bed (HOB) Positioning: HOB at 20-30 degrees  Taken 3/8/2025 0055 by Cate Rebolledo RN  Head of Bed (HOB) Positioning: HOB at 20-30 degrees  Taken 3/8/2025 0045 by Cate Rebolledo RN  Head of Bed (HOB) Positioning: HOB at 15 degrees     Problem: Alcohol Withdrawal  Goal: Alcohol Withdrawal Symptom Control  Outcome: Progressing  Goal: Optimal Neurologic Function  Outcome: Progressing     Problem: Delirium  Goal: Improved Sleep  Outcome: Progressing     Goal Outcome Evaluation:       Pt is A&Ox4. Urinal at bedside. Pt reports mild pain & does not want PRN pain meds on shift. CIWA score is 0. LLE splint & immobilizer is on, dressing  C/D/I & elevated on pillows. Pt slept in between cares.

## 2025-03-08 NOTE — PROGRESS NOTES
Orthopedic Progress Note      Assessment: 2 Days Post-Op  S/P Procedure(s):  OPEN REDUCTION INTERNAL FIXATION, FRACTURE, PATELLA  OPEN REDUCTION INTERNAL FIXATION, FRACTURE, ANKLE     Plan:   - Continue PT/OT  - Weightbearing status: NWB LLE  - Activity: Up with assist and assistive device until independent.  - Anticoagulation: ASA 81 PO BID in addition to SCDs, demarcus stockings and early ambulation.  - Antibiotics: 24 hours periop  - Pain Management; continue current regimen  - Diet: progress diet as tolerated  - Labs: transfuse if <7.0. No indication today  - Dressing: Keep dry and intact.  Splint to remain clean, dry, and intact  - Knee immobilizer on at all times locked in extension  - Elevation: Elevate LLE on pillow to keep above the level of the heart as much as possible  - Follow-up: Outpatient follow up in 2 weeks  - Disposition: Anticipate discharge TCU vs home pending progress with therapy.      Subjective:  No acute events overnight.  Pain controlled.  Tolerating oral intake.  Voiding.  Tolerating lower extremity bracing.      Objective:  BP (!) 147/78 (BP Location: Left arm)   Pulse 85   Temp 98.3  F (36.8  C) (Oral)   Resp 20   Wt 95.3 kg (210 lb)   SpO2 95%     The patient is A&Ox3. Appears comfortable, sitting up in bed  Calves without tenderness, neg Luciana's  Brisk capillary refill in the toes.   Palpable Left dorsalis pedis pulse. Left foot warm & well-perfused.  Sensation is intact to light touch & equal bilaterally in the femoral, DP, SP & tibial nerve distributions.  ROM: Appropriately flexes & extends all toes bilaterally. .   Leg lengths equal.  Dressing C/D/I without strikethrough, no surrounding erythema.  Splint C/D/I  Knee immobilizer in place      No drain     Pertinent Labs   Lab Results: personally reviewed.   Lab Results   Component Value Date    INR 1.02 03/05/2025     Lab Results   Component Value Date    WBC 11.5 (H) 03/07/2025    HGB 13.1 (L) 03/08/2025    HCT 41.8 03/07/2025     MCV 94 03/07/2025     03/07/2025     Lab Results   Component Value Date     03/07/2025    CO2 27 03/07/2025         Ajay Miller MD  [facility]

## 2025-03-08 NOTE — PLAN OF CARE
Problem: Adult Inpatient Plan of Care  Goal: Plan of Care Review  Description: The Plan of Care Review/Shift note should be completed every shift.  The Outcome Evaluation is a brief statement about your assessment that the patient is improving, declining, or no change.  This information will be displayed automatically on your shift  note.  Outcome: Progressing  Flowsheets (Taken 3/8/2025 9877)  Plan of Care Reviewed With:   patient   spouse   child   Goal Outcome Evaluation:      Plan of Care Reviewed With: patient, spouse, child      Patient alert and oriented. Able to communicate his needs. Surgical dressing dry and intact with hinged-brace in locked extension to LLE. Pain is managed with scheduled tylenol and prn oxycodone. LLE elevated up on pillows. NWB LLE. Able to transfer with the assist of (2) using a walker. Up in recliner for 30min. Back to bed d/t feeling uncomfortable in recliner. Voiding without difficulty. Complains of constipation-prn senna and MOM administered. TCU at discharge.  Qing Parker RN

## 2025-03-08 NOTE — PROGRESS NOTES
[facility]    Medicine Progress Note - Hospitalist Service    Date of Admission:  3/5/2025    Assessment & Plan   Kyle Lopez is a 80 year old male with a no significant past medical history who was admitted on 3/5/25 after presenting to [facility] ED for Multiple Fractures of the Left Lower Extremity..     Left Patella Fracture  Left Proximal Fibula Fracture  Left Distal Fibula Fracture  Left Medial Malleolus Fracture  - most likely mechanical fall  - s/p open reduction and internal fixation of left patella fracture and left ankle fracture old 3/6.  - Pain Regimen - Tylenol, oxycodone prn, dilaudid IV prn  - PT/OT consulted: likely needs TCU  - Echocardiogram : Estimated left ventricular ejection fraction of 55 to 60%  - post op management per ortho team     Mechanical Fall  - Patient with fall as above leading to multiple fractures of LLE.   - Fall sounds mechanical - slipped on ice - no dizziness/syncope  - PT/OT as above     Alcohol Use Disorder  - Patient reports drinking 2-3 drinks everyday  - EtOH level in the ED was 0.00  - MVI, folic acid, thiamine started on admission.  - scoring very low on CIWA           Diet: Advance Diet as Tolerated: Regular Diet Adult    DVT Prophylaxis: Pneumatic Compression Devices  Mon[place]iro Catheter: Not present  Lines: None     Cardiac Monitoring: None  Code Status: Full Code      Clinically Significant Risk Factors                                         Social Drivers of Health    Tobacco Use: Medium Risk (2/2/2021)    Received from [employer]    Patient History     Smoking Tobacco Use: Former     Smokeless Tobacco Use: Unknown    Received from [employer]    Social Connections          Disposition Plan     Medically Ready for Discharge: Anticipated in 2-4 Days             Lisy Baker MD  Hospitalist Service  [facility]  Securely message with [employer]  info)  Text page via [employer] Paging/Directory   ______________________________________________________________________    Interval History   No distress noted.  Denies having significant pain or discomfort if he does not move much.  Participating in therapy very well.  Management plan discussed with the patient and he expressed understanding.    Physical Exam   Vital Signs: Temp: 98.3  F (36.8  C) Temp src: Oral BP: (!) 147/78 Pulse: 85   Resp: 20 SpO2: 95 % O2 Device: None (Room air)    Weight: 210 lbs 0 oz    General Appearance:  No distress noted  Respiratory: Good air entry bilaterally  Cardiovascular: S1 and S2 well heard, no murmur or gallop  GI: Soft abdomen, no tenderness, normoactive bowel sound  Skin: Intact and warm    Medical Decision Making       40 MINUTES SPENT BY ME on the date of service doing chart review, history, exam, documentation & further activities per the note.      Data

## 2025-03-09 ENCOUNTER — APPOINTMENT (OUTPATIENT)
Dept: PHYSICAL THERAPY | Facility: HOSPITAL | Age: 80
DRG: 494 | End: 2025-03-09
Payer: COMMERCIAL

## 2025-03-09 ENCOUNTER — APPOINTMENT (OUTPATIENT)
Dept: OCCUPATIONAL THERAPY | Facility: HOSPITAL | Age: 80
DRG: 494 | End: 2025-03-09
Payer: COMMERCIAL

## 2025-03-09 LAB
ANION GAP SERPL CALCULATED.3IONS-SCNC: 7 MMOL/L (ref 7–15)
BUN SERPL-MCNC: 17.8 MG/DL (ref 8–23)
CALCIUM SERPL-MCNC: 9.1 MG/DL (ref 8.8–10.4)
CHLORIDE SERPL-SCNC: 104 MMOL/L (ref 98–107)
CREAT SERPL-MCNC: 1.1 MG/DL (ref 0.67–1.17)
EGFRCR SERPLBLD CKD-EPI 2021: 68 ML/MIN/1.73M2
ERYTHROCYTE [DISTWIDTH] IN BLOOD BY AUTOMATED COUNT: 13.2 % (ref 10–15)
GLUCOSE SERPL-MCNC: 96 MG/DL (ref 70–99)
HCO3 SERPL-SCNC: 29 MMOL/L (ref 22–29)
HCT VFR BLD AUTO: 40.1 % (ref 40–53)
HGB BLD-MCNC: 13.1 G/DL (ref 13.3–17.7)
MCH RBC QN AUTO: 31 PG (ref 26.5–33)
MCHC RBC AUTO-ENTMCNC: 32.7 G/DL (ref 31.5–36.5)
MCV RBC AUTO: 95 FL (ref 78–100)
PLATELET # BLD AUTO: 158 10E3/UL (ref 150–450)
POTASSIUM SERPL-SCNC: 4.4 MMOL/L (ref 3.4–5.3)
RBC # BLD AUTO: 4.23 10E6/UL (ref 4.4–5.9)
SODIUM SERPL-SCNC: 140 MMOL/L (ref 135–145)
WBC # BLD AUTO: 9.9 10E3/UL (ref 4–11)

## 2025-03-09 PROCEDURE — 250N000013 HC RX MED GY IP 250 OP 250 PS 637: Performed by: STUDENT IN AN ORGANIZED HEALTH CARE EDUCATION/TRAINING PROGRAM

## 2025-03-09 PROCEDURE — 120N000001 HC R&B MED SURG/OB

## 2025-03-09 PROCEDURE — 97530 THERAPEUTIC ACTIVITIES: CPT | Mod: GP

## 2025-03-09 PROCEDURE — 85041 AUTOMATED RBC COUNT: CPT | Performed by: STUDENT IN AN ORGANIZED HEALTH CARE EDUCATION/TRAINING PROGRAM

## 2025-03-09 PROCEDURE — 97110 THERAPEUTIC EXERCISES: CPT | Mod: GP

## 2025-03-09 PROCEDURE — 99232 SBSQ HOSP IP/OBS MODERATE 35: CPT | Performed by: STUDENT IN AN ORGANIZED HEALTH CARE EDUCATION/TRAINING PROGRAM

## 2025-03-09 PROCEDURE — 82435 ASSAY OF BLOOD CHLORIDE: CPT | Performed by: STUDENT IN AN ORGANIZED HEALTH CARE EDUCATION/TRAINING PROGRAM

## 2025-03-09 PROCEDURE — 80048 BASIC METABOLIC PNL TOTAL CA: CPT | Performed by: STUDENT IN AN ORGANIZED HEALTH CARE EDUCATION/TRAINING PROGRAM

## 2025-03-09 PROCEDURE — 97116 GAIT TRAINING THERAPY: CPT | Mod: GP

## 2025-03-09 PROCEDURE — 97535 SELF CARE MNGMENT TRAINING: CPT | Mod: GO

## 2025-03-09 PROCEDURE — 36415 COLL VENOUS BLD VENIPUNCTURE: CPT | Performed by: STUDENT IN AN ORGANIZED HEALTH CARE EDUCATION/TRAINING PROGRAM

## 2025-03-09 PROCEDURE — 85018 HEMOGLOBIN: CPT | Performed by: STUDENT IN AN ORGANIZED HEALTH CARE EDUCATION/TRAINING PROGRAM

## 2025-03-09 PROCEDURE — 82565 ASSAY OF CREATININE: CPT | Performed by: STUDENT IN AN ORGANIZED HEALTH CARE EDUCATION/TRAINING PROGRAM

## 2025-03-09 PROCEDURE — 250N000013 HC RX MED GY IP 250 OP 250 PS 637: Performed by: PHYSICIAN ASSISTANT

## 2025-03-09 RX ORDER — HYDROMORPHONE HCL IN WATER/PF 6 MG/30 ML
0.2 PATIENT CONTROLLED ANALGESIA SYRINGE INTRAVENOUS
Status: DISCONTINUED | OUTPATIENT
Start: 2025-03-09 | End: 2025-03-11 | Stop reason: HOSPADM

## 2025-03-09 RX ADMIN — SENNOSIDES AND DOCUSATE SODIUM 1 TABLET: 50; 8.6 TABLET ORAL at 07:47

## 2025-03-09 RX ADMIN — OXYCODONE HYDROCHLORIDE 5 MG: 5 TABLET ORAL at 17:51

## 2025-03-09 RX ADMIN — ACETAMINOPHEN 975 MG: 325 TABLET ORAL at 19:51

## 2025-03-09 RX ADMIN — ACETAMINOPHEN 975 MG: 325 TABLET ORAL at 11:25

## 2025-03-09 RX ADMIN — ASPIRIN 81 MG: 81 TABLET, COATED ORAL at 07:46

## 2025-03-09 RX ADMIN — FOLIC ACID 1 MG: 1 TABLET ORAL at 07:47

## 2025-03-09 RX ADMIN — POLYETHYLENE GLYCOL 3350 17 G: 17 POWDER, FOR SOLUTION ORAL at 07:46

## 2025-03-09 RX ADMIN — ACETAMINOPHEN 975 MG: 325 TABLET ORAL at 03:56

## 2025-03-09 RX ADMIN — ASPIRIN 81 MG: 81 TABLET, COATED ORAL at 19:51

## 2025-03-09 RX ADMIN — OXYCODONE HYDROCHLORIDE 5 MG: 5 TABLET ORAL at 07:47

## 2025-03-09 RX ADMIN — THIAMINE HCL TAB 100 MG 100 MG: 100 TAB at 07:46

## 2025-03-09 RX ADMIN — OXYCODONE HYDROCHLORIDE 5 MG: 5 TABLET ORAL at 13:49

## 2025-03-09 RX ADMIN — Medication 1 TABLET: at 07:46

## 2025-03-09 ASSESSMENT — ACTIVITIES OF DAILY LIVING (ADL)
ADLS_ACUITY_SCORE: 46
ADLS_ACUITY_SCORE: 47
ADLS_ACUITY_SCORE: 47
ADLS_ACUITY_SCORE: 46
ADLS_ACUITY_SCORE: 47
ADLS_ACUITY_SCORE: 46
ADLS_ACUITY_SCORE: 47
ADLS_ACUITY_SCORE: 46
ADLS_ACUITY_SCORE: 47
ADLS_ACUITY_SCORE: 46
ADLS_ACUITY_SCORE: 47
ADLS_ACUITY_SCORE: 47
ADLS_ACUITY_SCORE: 46
ADLS_ACUITY_SCORE: 47

## 2025-03-09 NOTE — PROGRESS NOTES
Orthopedic surgery progress note:  No events overnight.      Vital stable left lower extremity demonstrates dressings in place as well as a well-padded splint. Wiggles toes.  2+ DP pulse.  Sensation tact light touch in all distributions.    80 year-old male status post open reduction internal fixation of a left patella fracture as well as distal fibula that is overall doing well.    NWB LLE  ASA 81 mg BID  Maintain dressing and splint  Follow up in 2 weeks as scheduled      Chris Wren MD  [facility]

## 2025-03-09 NOTE — PROGRESS NOTES
[facility]    Medicine Progress Note - Hospitalist Service    Date of Admission:  3/5/2025    Assessment & Plan   Kyle Lopez is a 80 year old male with a no significant past medical history who was admitted on 3/5/25 after presenting to [facility] ED for Multiple Fractures of the Left Lower Extremity..     Left Patella Fracture  Left Proximal Fibula Fracture  Left Distal Fibula Fracture  Left Medial Malleolus Fracture  - most likely mechanical fall  - s/p open reduction and internal fixation of left patella fracture and left ankle fracture old 3/6.  - Pain Regimen - Tylenol, oxycodone prn, dilaudid IV prn  - PT/OT consulted: likely needs TCU  - Echocardiogram : Estimated left ventricular ejection fraction of 55 to 60%  - post op management per ortho team  - hg : 13.1    Mechanical Fall  - Patient with fall as above leading to multiple fractures of LLE.   - Fall sounds mechanical - slipped on ice - no dizziness/syncope  - PT/OT as above     Alcohol Use Disorder  - Patient reports drinking 2-3 drinks everyday  - EtOH level in the ED was 0.00  - MVI, folic acid, thiamine started on admission.  - scoring very low on CIWA           Diet: Advance Diet as Tolerated: Regular Diet Adult    DVT Prophylaxis: Pneumatic Compression Devices  Monteiro Catheter: Not present  Lines: None     Cardiac Monitoring: None  Code Status: Full Code      Clinically Significant Risk Factors                                         Social Drivers of Health    Tobacco Use: Medium Risk (2/2/2021)    Received from [facility]    Patient History     Smoking Tobacco Use: Former     Smokeless Tobacco Use: Unknown    Received from [facility]    Social Connections          Disposition Plan     Medically Ready for Discharge: Ready Now             Lisy Baker MD  Hospitalist Service  [facility]  Securely message with [employer]  info)  Text page via [employer] Paging/Directory   ______________________________________________________________________    Interval History   No distress noted.  Patient states as long as he does not move much pain is tolerable.  No new complaints.  Management plan discussed with the patient and his wife who was present at the bedside.  Patient is medically ready awaiting TCU placement    Physical Exam   Vital Signs: Temp: 99  F (37.2  C) Temp src: Oral BP: 124/67 Pulse: 79   Resp: 20 SpO2: 95 % O2 Device: None (Room air)    Weight: 210 lbs 0 oz    General Appearance:  No distress noted  Respiratory: Good air entry bilaterally  Cardiovascular: S1 and S2 well heard, no murmur or gallop  GI: Soft abdomen, no tenderness, normoactive bowel sound  Skin: Intact and warm    Medical Decision Making       40 MINUTES SPENT BY ME on the date of service doing chart review, history, exam, documentation & further activities per the note.      Data

## 2025-03-09 NOTE — PLAN OF CARE
Problem: Adult Inpatient Plan of Care  Goal: Plan of Care Review  Description: The Plan of Care Review/Shift note should be completed every shift.  The Outcome Evaluation is a brief statement about your assessment that the patient is improving, declining, or no change.  This information will be displayed automatically on your shift  note.  Outcome: Progressing   Goal Outcome Evaluation:      Plan of Care Reviewed With: patient, spouse, child  Patient alert and oriented. Surgical splint with locked hinged-brace intact to LLE. CMS+ Ortho following. NWB LLE. Transfers with the assist of (1) using a walker. PT/OT scheduled. Dangles on the edge of the bed throughout the day. Regular diet with good intake. Voiding without difficulty. BM 3/8. TCU at discharge.  Qing Parker RN

## 2025-03-09 NOTE — PLAN OF CARE
Problem: Adult Inpatient Plan of Care  Goal: Absence of Hospital-Acquired Illness or Injury  Outcome: Progressing  Intervention: Identify and Manage Fall Risk  Recent Flowsheet Documentation  Taken 3/9/2025 0047 by Cate Rebolledo RN  Safety Promotion/Fall Prevention:   safety round/check completed   activity supervised   clutter free environment maintained   lighting adjusted   patient and family education  Intervention: Prevent Skin Injury  Recent Flowsheet Documentation  Taken 3/9/2025 0047 by Cate Rebolledo RN  Body Position:   supine, head elevated   legs elevated  Intervention: Prevent Infection  Recent Flowsheet Documentation  Taken 3/9/2025 0047 by Cate Rebolledo RN  Infection Prevention: rest/sleep promoted  Goal: Optimal Comfort and Wellbeing  Outcome: Progressing     Problem: Pain Acute  Goal: Optimal Pain Control and Function  Outcome: Progressing  Intervention: Prevent or Manage Pain  Recent Flowsheet Documentation  Taken 3/9/2025 0047 by Cate Rebolledo RN  Bowel Elimination Promotion: adequate fluid intake promoted  Medication Review/Management:   high-risk medications identified   medications reviewed     Problem: Fall Injury Risk  Goal: Absence of Fall and Fall-Related Injury  Outcome: Progressing  Intervention: Identify and Manage Contributors  Recent Flowsheet Documentation  Taken 3/9/2025 0047 by Cate Rebolledo RN  Medication Review/Management:   high-risk medications identified   medications reviewed  Intervention: Promote Injury-Free Environment  Recent Flowsheet Documentation  Taken 3/9/2025 0047 by Cate Rebolledo RN  Safety Promotion/Fall Prevention:   safety round/check completed   activity supervised   clutter free environment maintained   lighting adjusted   patient and family education     Problem: Mobility Impairment  Goal: Optimal Mobility  Outcome: Progressing  Intervention: Optimize Mobility  Recent Flowsheet Documentation  Taken 3/9/2025 0047 by Cate Rebolledo RN  Activity Management: activity adjusted per  tolerance  Positioning/Transfer Devices:   pillows   in use     Problem: Orthopaedic Fracture  Goal: Absence of Bleeding  Outcome: Progressing  Intervention: Monitor and Manage Fracture Bleeding  Recent Flowsheet Documentation  Taken 3/9/2025 0047 by Cate Rebolledo RN  Fracture Immobilization: immobilization device maintained  Bleeding Management:   affected area elevated   dressing monitored  Goal: Bowel Elimination  Outcome: Progressing  Intervention: Promote Effective Bowel Elimination  Recent Flowsheet Documentation  Taken 3/9/2025 0047 by Cate Rebolledo RN  Bowel Elimination Promotion: adequate fluid intake promoted  Goal: Fracture Stability  Intervention: Promote Fracture Stability and Healing  Recent Flowsheet Documentation  Taken 3/9/2025 0047 by Cate Rebolledo RN  Fracture Immobilization: immobilization device maintained  Goal: Optimal Functional Ability  Outcome: Progressing  Intervention: Optimize Functional Ability  Recent Flowsheet Documentation  Taken 3/9/2025 0047 by Cate Rebolledo RN  Activity Management: activity adjusted per tolerance  Positioning/Transfer Devices:   pillows   in use  Goal: Effective Tissue Perfusion  Outcome: Progressing  Goal: Optimal Pain Control and Function  Outcome: Progressing  Goal: Effective Oxygenation and Ventilation  Outcome: Progressing  Intervention: Promote Airway Secretion Clearance  Recent Flowsheet Documentation  Taken 3/9/2025 0047 by Cate Rebolledo RN  Cough And Deep Breathing: done independently per patient  Activity Management: activity adjusted per tolerance  Intervention: Optimize Oxygenation and Ventilation  Recent Flowsheet Documentation  Taken 3/9/2025 0047 by Cate Rebolledo RN  Head of Bed (HOB) Positioning: HOB at 15 degrees     Problem: Alcohol Withdrawal  Goal: Alcohol Withdrawal Symptom Control  Outcome: Progressing     Problem: Delirium  Goal: Improved Sleep  Outcome: Progressing     Goal Outcome Evaluation:  Pt is A&Ox4. Urinal at bedside. LLE dressing C/D/I & elevated on  pillows. CMS is intact. Pain managed with schedule tylenol. Denies pain at rest. Pt slept in between cares.

## 2025-03-10 ENCOUNTER — APPOINTMENT (OUTPATIENT)
Dept: PHYSICAL THERAPY | Facility: HOSPITAL | Age: 80
DRG: 494 | End: 2025-03-10
Payer: COMMERCIAL

## 2025-03-10 LAB — HGB BLD-MCNC: 13.3 G/DL (ref 13.3–17.7)

## 2025-03-10 PROCEDURE — 250N000013 HC RX MED GY IP 250 OP 250 PS 637: Performed by: STUDENT IN AN ORGANIZED HEALTH CARE EDUCATION/TRAINING PROGRAM

## 2025-03-10 PROCEDURE — 99232 SBSQ HOSP IP/OBS MODERATE 35: CPT | Performed by: STUDENT IN AN ORGANIZED HEALTH CARE EDUCATION/TRAINING PROGRAM

## 2025-03-10 PROCEDURE — 85018 HEMOGLOBIN: CPT | Performed by: STUDENT IN AN ORGANIZED HEALTH CARE EDUCATION/TRAINING PROGRAM

## 2025-03-10 PROCEDURE — 250N000013 HC RX MED GY IP 250 OP 250 PS 637: Performed by: PHYSICIAN ASSISTANT

## 2025-03-10 PROCEDURE — 97530 THERAPEUTIC ACTIVITIES: CPT | Mod: GP

## 2025-03-10 PROCEDURE — 120N000001 HC R&B MED SURG/OB

## 2025-03-10 PROCEDURE — 36415 COLL VENOUS BLD VENIPUNCTURE: CPT | Performed by: STUDENT IN AN ORGANIZED HEALTH CARE EDUCATION/TRAINING PROGRAM

## 2025-03-10 RX ORDER — OXYCODONE HYDROCHLORIDE 5 MG/1
5 TABLET ORAL EVERY 4 HOURS PRN
Qty: 20 TABLET | Refills: 0 | Status: SHIPPED | OUTPATIENT
Start: 2025-03-10

## 2025-03-10 RX ORDER — ACETAMINOPHEN 325 MG/1
975 TABLET ORAL EVERY 8 HOURS
Qty: 100 TABLET | Refills: 0 | Status: SHIPPED | OUTPATIENT
Start: 2025-03-10 | End: 2025-03-11

## 2025-03-10 RX ORDER — ASPIRIN 81 MG/1
81 TABLET ORAL 2 TIMES DAILY
Qty: 60 TABLET | Refills: 0 | Status: SHIPPED | OUTPATIENT
Start: 2025-03-10 | End: 2025-03-11

## 2025-03-10 RX ORDER — AMOXICILLIN 250 MG
1 CAPSULE ORAL 2 TIMES DAILY PRN
Qty: 30 TABLET | Refills: 0 | Status: SHIPPED | OUTPATIENT
Start: 2025-03-10 | End: 2025-03-11

## 2025-03-10 RX ADMIN — ACETAMINOPHEN 975 MG: 325 TABLET ORAL at 04:25

## 2025-03-10 RX ADMIN — ASPIRIN 81 MG: 81 TABLET, COATED ORAL at 20:03

## 2025-03-10 RX ADMIN — SENNOSIDES AND DOCUSATE SODIUM 1 TABLET: 50; 8.6 TABLET ORAL at 09:09

## 2025-03-10 RX ADMIN — FOLIC ACID 1 MG: 1 TABLET ORAL at 09:09

## 2025-03-10 RX ADMIN — ACETAMINOPHEN 975 MG: 325 TABLET ORAL at 20:02

## 2025-03-10 RX ADMIN — ASPIRIN 81 MG: 81 TABLET, COATED ORAL at 09:09

## 2025-03-10 RX ADMIN — THIAMINE HCL TAB 100 MG 100 MG: 100 TAB at 09:09

## 2025-03-10 RX ADMIN — POLYETHYLENE GLYCOL 3350 17 G: 17 POWDER, FOR SOLUTION ORAL at 09:09

## 2025-03-10 RX ADMIN — ACETAMINOPHEN 975 MG: 325 TABLET ORAL at 12:26

## 2025-03-10 RX ADMIN — SENNOSIDES AND DOCUSATE SODIUM 1 TABLET: 50; 8.6 TABLET ORAL at 20:03

## 2025-03-10 RX ADMIN — OXYCODONE HYDROCHLORIDE 5 MG: 5 TABLET ORAL at 04:26

## 2025-03-10 RX ADMIN — Medication 1 TABLET: at 09:09

## 2025-03-10 ASSESSMENT — ACTIVITIES OF DAILY LIVING (ADL)
ADLS_ACUITY_SCORE: 46
ADLS_ACUITY_SCORE: 46
ADLS_ACUITY_SCORE: 53
ADLS_ACUITY_SCORE: 46
ADLS_ACUITY_SCORE: 46
ADLS_ACUITY_SCORE: 53
ADLS_ACUITY_SCORE: 46
ADLS_ACUITY_SCORE: 53
ADLS_ACUITY_SCORE: 46
ADLS_ACUITY_SCORE: 53
ADLS_ACUITY_SCORE: 46
ADLS_ACUITY_SCORE: 53
ADLS_ACUITY_SCORE: 46
ADLS_ACUITY_SCORE: 53
ADLS_ACUITY_SCORE: 53
ADLS_ACUITY_SCORE: 46

## 2025-03-10 NOTE — PROGRESS NOTES
Orthopedic Progress Note      Assessment: 4 Days Post-Op  S/P Procedure(s):  OPEN REDUCTION INTERNAL FIXATION, FRACTURE, PATELLA  OPEN REDUCTION INTERNAL FIXATION, FRACTURE, ANKLE     Plan:   - Continue PT/OT  - Weightbearing status: NWB LLE  - Activity: Up with assist and assistive device until independent.  - Anticoagulation: ASA 81 PO BID in addition to SCDs, demarcus stockings and early ambulation.  - Pain Management; continue current regimen  - Diet: progress diet as tolerated  - Labs: hgb 13.3, transfuse if <7.0. No indication today  - Dressing: Keep dry and intact.  Splint to remain clean, dry, and intact  - Knee immobilizer on at all times locked in extension  - Elevation: Elevate LLE on pillow to keep above the level of the heart as much as possible  - Follow-up: Outpatient follow up in 2 weeks  - Disposition: Anticipate discharge TCU tomorrow.  Okay to discharge from an orthopedic standpoint.        Subjective:  Pain: moderate  Nausea, Vomiting:  No  Lightheadedness, Dizziness:  No  Neuro:  Patient denies new onset numbness or paresthesias  Fever, chills: No  Chest pain: No  SOB: No    Patient reports feeling well today. Patient reports pain is tolerable with current pain regimen. Patient eating and drinking well. Patient voiding and passing gas and has had a BM.  He has had some difficulty with therapy over the weekend, mostly challenged by the low chair in his room which she feels is putting a lot of stress on his leg and causing pain when he is at rest.. All questions/concerns answered.      Objective:  BP (!) 136/91 (BP Location: Left arm)   Pulse 72   Temp 97.7  F (36.5  C) (Oral)   Resp 19   Wt 95.3 kg (210 lb)   SpO2 96%     The patient is A&Ox3. Appears comfortable, sitting up in bed  Calves without tenderness, neg Luciana's  Brisk capillary refill in the toes.   Palpable Left dorsalis pedis pulse. Left foot warm & well-perfused.  Sensation is intact to light touch & equal bilaterally in the femoral,  DP, SP & tibial nerve distributions.  ROM: Appropriately flexes & extends all toes bilaterally. .   Leg lengths equal.  Dressing C/D/I without strikethrough, no surrounding erythema.  Splint C/D/I  Knee immobilizer in place      No drain     Pertinent Labs   Lab Results: personally reviewed.   Lab Results   Component Value Date    INR 1.02 03/05/2025     Lab Results   Component Value Date    WBC 9.9 03/09/2025    HGB 13.3 03/10/2025    HCT 40.1 03/09/2025    MCV 95 03/09/2025     03/09/2025     Lab Results   Component Value Date     03/09/2025    CO2 29 03/09/2025         Report completed by:  SUNNY CABEZAS PA-C  Date: 03/10/2025  [facility]

## 2025-03-10 NOTE — PLAN OF CARE
Goal Outcome Evaluation:      Plan of Care Reviewed With: patient, spouse    Overall Patient Progress: improvingOverall Patient Progress: improving    Outcome Evaluation: Pt up in chair with 1 assist. Splint/Immobilizer on. NWB. Denies need for pain medication. CMS intact. Voiding well. Awaiting TCU placement.

## 2025-03-10 NOTE — PROGRESS NOTES
Care Management Follow Up    Length of Stay (days): 5    Expected Discharge Date: 03/10/2025    Anticipated Discharge Plan:   [facility]    Transportation: Confirmed Wheelchair. Transportation costs discussed? Yes. Discussed with Anahy - wife at bedside     PT Recommendations: Transitional Care Facility  OT Recommendations:  Transitional Care Facility     Barriers to Discharge: placement    Prior Living Situation: house with spouse    Discussed  Partnership in Safe Discharge Planning  document with patient/family: Yes:      Handoff Completed: No, handoff not indicated or clinically appropriate    Patient/Spokesperson Updated: Yes. Who? Patient and wife at bedside    Additional Information:  Pt has a bed tomorrow at [facility]- confirmed with Theron in admissions.  Sw met with patient and his wife at bedside. They are both in agreement to accepting bed and aware of private room fee.   [employer] vadim auth submitted.     3:17 PM  Vadim auth approved. Patient has a ride time at 12:37pm to [facility] tomorrow.  Next Steps: Set up a ride    Tiffanie Sheriff, PAULETTESW

## 2025-03-10 NOTE — PROGRESS NOTES
[facility]    Medicine Progress Note - Hospitalist Service    Date of Admission:  3/5/2025    Assessment & Plan   Kyle Lopez is a 80 year old male with a no significant past medical history who was admitted on 3/5/25 after presenting to [facility] ED for Multiple Fractures of the Left Lower Extremity..  Medically ready for discharge awaiting TCU placement.     Left Patella Fracture  Left Proximal Fibula Fracture  Left Distal Fibula Fracture  Left Medial Malleolus Fracture  - most likely mechanical fall  - s/p open reduction and internal fixation of left patella fracture and left ankle fracture old 3/6.  - Pain Regimen - Tylenol, oxycodone prn, dilaudid IV prn  - PT/OT consulted: likely needs TCU  - Echocardiogram : Estimated left ventricular ejection fraction of 55 to 60%  - post op management per ortho team  - hg : 13.1    Mechanical Fall  - Patient with fall as above leading to multiple fractures of LLE.   - Fall sounds mechanical - slipped on ice - no dizziness/syncope  - PT/OT as above     Alcohol Use Disorder  - Patient reports drinking 2-3 drinks everyday  - EtOH level in the ED was 0.00  - MVI, folic acid, thiamine started on admission.  - scoring very low on CIWA           Diet: Advance Diet as Tolerated: Regular Diet Adult  Discharge Instruction - Regular Diet Adult    DVT Prophylaxis: Pneumatic Compression Devices  Monteiro Catheter: Not present  Lines: None     Cardiac Monitoring: None  Code Status: Full Code      Clinically Significant Risk Factors                                         Social Drivers of Health    Tobacco Use: Medium Risk (2/2/2021)    Received from [facility]    Patient History     Smoking Tobacco Use: Former     Smokeless Tobacco Use: Unknown    Received from [facility]    Social Connections          Disposition Plan     Medically Ready for Discharge: Ready Now             Lisy Baker  MD  Hospitalist Service  [facility]  Securely message with [employer] (more info)  Text page via [employer] Paging/Directory   ______________________________________________________________________    Interval History   No distress noted.  Patient states pain is manageable currently.  He is participating in therapy very well.  Patient is medically ready for discharge once TCU is available.    Physical Exam   Vital Signs: Temp: 98.3  F (36.8  C) Temp src: Oral BP: (!) 140/64 Pulse: 86   Resp: 18 SpO2: 96 % O2 Device: None (Room air)    Weight: 210 lbs 0 oz    General Appearance:  No distress noted  Respiratory: Good air entry bilaterally  Cardiovascular: S1 and S2 well heard, no murmur or gallop  GI: Soft abdomen, no tenderness, normoactive bowel sound  Skin: Intact and warm    Medical Decision Making       40 MINUTES SPENT BY ME on the date of service doing chart review, history, exam, documentation & further activities per the note.      Data

## 2025-03-10 NOTE — PLAN OF CARE
Problem: Adult Inpatient Plan of Care  Goal: Absence of Hospital-Acquired Illness or Injury  Outcome: Progressing  Intervention: Identify and Manage Fall Risk  Recent Flowsheet Documentation  Taken 3/10/2025 0048 by Cate Rebolledo RN  Safety Promotion/Fall Prevention:   safety round/check completed   activity supervised   clutter free environment maintained   lighting adjusted   patient and family education  Intervention: Prevent Skin Injury  Recent Flowsheet Documentation  Taken 3/10/2025 0055 by Cate Rebolledo RN  Body Position:   turned   neutral body alignment   legs elevated  Intervention: Prevent Infection  Recent Flowsheet Documentation  Taken 3/10/2025 0048 by Cate Rebolledo RN  Infection Prevention: rest/sleep promoted     Problem: Pain Acute  Goal: Optimal Pain Control and Function  Outcome: Progressing  Intervention: Optimize Psychosocial Wellbeing  Recent Flowsheet Documentation  Taken 3/10/2025 0048 by Cate Rebolledo RN  Supportive Measures: active listening utilized  Intervention: Prevent or Manage Pain  Recent Flowsheet Documentation  Taken 3/10/2025 0048 by Cate Rebolledo RN  Sensory Stimulation Regulation:   care clustered   lighting decreased   quiet environment promoted  Bowel Elimination Promotion: adequate fluid intake promoted  Medication Review/Management:   high-risk medications identified   medications reviewed     Problem: Fall Injury Risk  Goal: Absence of Fall and Fall-Related Injury  Outcome: Progressing  Intervention: Identify and Manage Contributors  Recent Flowsheet Documentation  Taken 3/10/2025 0048 by Cate Rebolledo RN  Medication Review/Management:   high-risk medications identified   medications reviewed  Intervention: Promote Injury-Free Environment  Recent Flowsheet Documentation  Taken 3/10/2025 0048 by Cate Rebolledo RN  Safety Promotion/Fall Prevention:   safety round/check completed   activity supervised   clutter free environment maintained   lighting adjusted   patient and family education      Problem: Mobility Impairment  Goal: Optimal Mobility  Outcome: Progressing  Intervention: Optimize Mobility  Recent Flowsheet Documentation  Taken 3/10/2025 0055 by Cate Rebolledo RN  Positioning/Transfer Devices:   pillows   in use  Taken 3/10/2025 0048 by Cate Rebolledo RN  Activity Management: activity adjusted per tolerance     Problem: Orthopaedic Fracture  Goal: Absence of Bleeding  Outcome: Progressing  Intervention: Monitor and Manage Fracture Bleeding  Recent Flowsheet Documentation  Taken 3/10/2025 0048 by Cate Rebolledo RN  Fracture Immobilization: immobilization device maintained  Bleeding Management:   affected area elevated   dressing monitored     Problem: Orthopaedic Fracture  Goal: Optimal Functional Ability  Outcome: Progressing  Intervention: Optimize Functional Ability  Recent Flowsheet Documentation  Taken 3/10/2025 0055 by Cate Rebolledo RN  Positioning/Transfer Devices:   pillows   in use  Taken 3/10/2025 0048 by Cate Rebolledo RN  Activity Management: activity adjusted per tolerance     Problem: Orthopaedic Fracture  Goal: Effective Tissue Perfusion  Outcome: Progressing     Problem: Orthopaedic Fracture  Goal: Effective Oxygenation and Ventilation  Outcome: Progressing  Intervention: Promote Airway Secretion Clearance  Recent Flowsheet Documentation  Taken 3/10/2025 0048 by Cate Rebolledo RN  Cough And Deep Breathing: done independently per patient  Activity Management: activity adjusted per tolerance  Intervention: Optimize Oxygenation and Ventilation  Recent Flowsheet Documentation  Taken 3/10/2025 0055 by Cate Rebolledo RN  Head of Bed (HOB) Positioning: HOB at 20-30 degrees     Problem: Alcohol Withdrawal  Goal: Optimal Neurologic Function  Outcome: Progressing  Intervention: Minimize or Manage Acute Neurologic Symptoms  Recent Flowsheet Documentation  Taken 3/10/2025 0048 by Cate Rebolledo RN  Sensory Stimulation Regulation:   care clustered   lighting decreased   quiet environment promoted     Problem:  Delirium  Goal: Improved Sleep  Outcome: Progressing     Goal Outcome Evaluation:        Pt is A&Ox4. Urinal at bedside. Pain managed with schedule tylenol & PRN 5mg oxy. LLE splint on, dressing C/D/I & elevated on pillows. No PIV. Pt slept in between cares.

## 2025-03-11 VITALS
DIASTOLIC BLOOD PRESSURE: 67 MMHG | TEMPERATURE: 99.7 F | RESPIRATION RATE: 18 BRPM | HEART RATE: 86 BPM | WEIGHT: 210 LBS | OXYGEN SATURATION: 100 % | SYSTOLIC BLOOD PRESSURE: 125 MMHG

## 2025-03-11 PROCEDURE — 250N000013 HC RX MED GY IP 250 OP 250 PS 637: Performed by: PHYSICIAN ASSISTANT

## 2025-03-11 PROCEDURE — 99239 HOSP IP/OBS DSCHRG MGMT >30: CPT | Performed by: INTERNAL MEDICINE

## 2025-03-11 PROCEDURE — 250N000013 HC RX MED GY IP 250 OP 250 PS 637: Performed by: STUDENT IN AN ORGANIZED HEALTH CARE EDUCATION/TRAINING PROGRAM

## 2025-03-11 RX ORDER — ASPIRIN 81 MG/1
81 TABLET ORAL 2 TIMES DAILY
DISCHARGE
Start: 2025-03-11

## 2025-03-11 RX ORDER — ACETAMINOPHEN 325 MG/1
975 TABLET ORAL EVERY 8 HOURS
DISCHARGE
Start: 2025-03-11

## 2025-03-11 RX ORDER — AMOXICILLIN 250 MG
1 CAPSULE ORAL 2 TIMES DAILY PRN
DISCHARGE
Start: 2025-03-11

## 2025-03-11 RX ADMIN — OXYCODONE HYDROCHLORIDE 5 MG: 5 TABLET ORAL at 08:56

## 2025-03-11 RX ADMIN — FOLIC ACID 1 MG: 1 TABLET ORAL at 08:51

## 2025-03-11 RX ADMIN — ACETAMINOPHEN 975 MG: 325 TABLET ORAL at 04:00

## 2025-03-11 RX ADMIN — Medication 1 TABLET: at 08:50

## 2025-03-11 RX ADMIN — ASPIRIN 81 MG: 81 TABLET, COATED ORAL at 08:51

## 2025-03-11 ASSESSMENT — ACTIVITIES OF DAILY LIVING (ADL)
ADLS_ACUITY_SCORE: 53

## 2025-03-11 NOTE — PLAN OF CARE
"  Problem: Adult Inpatient Plan of Care  Goal: Plan of Care Review  Description: The Plan of Care Review/Shift note should be completed every shift.  The Outcome Evaluation is a brief statement about your assessment that the patient is improving, declining, or no change.  This information will be displayed automatically on your shift  note.  Outcome: Adequate for Care Transition  Goal: Patient-Specific Goal (Individualized)  Description: You can add care plan individualizations to a care plan. Examples of Individualization might be:  \"Parent requests to be called daily at 9am for status\", \"I have a hard time hearing out of my right ear\", or \"Do not touch me to wake me up as it startles  me\".  Outcome: Adequate for Care Transition  Goal: Absence of Hospital-Acquired Illness or Injury  Outcome: Adequate for Care Transition  Intervention: Identify and Manage Fall Risk  Recent Flowsheet Documentation  Taken 3/11/2025 0900 by Vidya Ewing RN  Safety Promotion/Fall Prevention:   activity supervised   assistive device/personal items within reach   clutter free environment maintained   increased rounding and observation   mobility aid in reach   nonskid shoes/slippers when out of bed   patient and family education   room near nurse's station   safety round/check completed  Intervention: Prevent Skin Injury  Recent Flowsheet Documentation  Taken 3/11/2025 0900 by Vidya Ewing RN  Body Position:   position changed independently   lower extremity elevated   heels elevated  Skin Protection: adhesive use limited  Intervention: Prevent and Manage VTE (Venous Thromboembolism) Risk  Recent Flowsheet Documentation  Taken 3/11/2025 0900 by Vidya Ewing RN  VTE Prevention/Management: SCDs on (sequential compression devices)  Intervention: Prevent Infection  Recent Flowsheet Documentation  Taken 3/11/2025 0900 by Vidya Ewing RN  Infection Prevention:   single patient room provided   rest/sleep " promoted   hand hygiene promoted  Goal: Optimal Comfort and Wellbeing  Outcome: Adequate for Care Transition  Intervention: Monitor Pain and Promote Comfort  Recent Flowsheet Documentation  Taken 3/11/2025 0856 by Vidya Ewing RN  Pain Management Interventions:   around-the-clock dosing utilized   emotional support   pillow support provided   premedicated for activity  Goal: Readiness for Transition of Care  Outcome: Adequate for Care Transition   Goal Outcome Evaluation:  Pt is alert and orientedx4-CIWA score 0. VSS. Pt is on RA. He had 0 pain this am-given oxycodone 5mg in anticipation of moving. Up to commode with assist of 1 and tolerated well. Left leg up on pillows-skin checked at top and bottom of splint-was intact-unable to remove ace wrap fully as it was wound into the splint . Immobilizer intact. CMS intact. AVS reviewed with pt and his wife. He is ready for discharge to [facility]-for further rehab.

## 2025-03-11 NOTE — PROGRESS NOTES
Orthopedic Progress Note      Assessment: 5 Days Post-Op  S/P Procedure(s):  OPEN REDUCTION INTERNAL FIXATION, FRACTURE, PATELLA  OPEN REDUCTION INTERNAL FIXATION, FRACTURE, ANKLE     Plan:   - Continue PT/OT  - Weightbearing status: NWB LLE  - Activity: Up with assist and assistive device until independent.  - Anticoagulation: ASA 81 PO BID in addition to SCDs, demarcus stockings and early ambulation.  - Pain Management; continue current regimen  - Diet: progress diet as tolerated  - Labs: hgb 13.3, transfuse if <7.0. No indication today  - Dressing: Keep dry and intact.  Splint to remain clean, dry, and intact  - Knee immobilizer on at all times locked in extension  - Elevation: Elevate LLE on pillow to keep above the level of the heart as much as possible  - Follow-up: Outpatient follow up in 2 weeks  - Disposition: Anticipate discharge TCU today.  Okay to discharge from an orthopedic standpoint.          Subjective:  Pain: moderate  Nausea, Vomiting:  No  Lightheadedness, Dizziness:  No  Neuro:  Patient denies new onset numbness or paresthesias  Fever, chills: No  Chest pain: No  SOB: No    Patient reports feeling well today. Patient reports pain is tolerable with current pain regimen. Patient eating and drinking well. Patient voiding and passing gas and has had a BM. All questions/concerns answered.      Objective:  /67 (BP Location: Left arm)   Pulse 86   Temp 99.7  F (37.6  C) (Oral)   Resp 18   Wt 95.3 kg (210 lb)   SpO2 100%     The patient is A&Ox3. Appears comfortable, sitting up in bed  Calves without tenderness, neg Luciana's  Brisk capillary refill in the toes.   Palpable Left dorsalis pedis pulse. Left foot warm & well-perfused.  Sensation is intact to light touch & equal bilaterally in the femoral, DP, SP & tibial nerve distributions.  ROM: Appropriately flexes & extends all toes bilaterally. .   Leg lengths equal.  Dressing C/D/I without strikethrough, no surrounding erythema.  Splint  C/D/I  Knee immobilizer in place      No drain     Pertinent Labs   Lab Results: personally reviewed.   Lab Results   Component Value Date    INR 1.02 03/05/2025     Lab Results   Component Value Date    WBC 9.9 03/09/2025    HGB 13.3 03/10/2025    HCT 40.1 03/09/2025    MCV 95 03/09/2025     03/09/2025     Lab Results   Component Value Date     03/09/2025    CO2 29 03/09/2025         Report completed by:  SUNNY CABEZAS PA-C  Date: 03/11/2025  [facility]

## 2025-03-11 NOTE — PROGRESS NOTES
Physical Therapy Discharge Summary    Reason for therapy discharge:    Discharged to TCU.    Progress towards therapy goal(s). See goals on Care Plan in [facility] electronic health record for goal details.  Goals partially met.  Barriers to achieving goals:   discharge from facility.    Therapy recommendation(s):    Further recommended therapy is related to documented deficits, and is necessary to maximize functional independence in order for patient to return to prior level of function.      Ciara Lopez, SEBASTIAN 3/11/2025

## 2025-03-11 NOTE — PLAN OF CARE
Pt is A&Ox4, calm, cooperative  - vitals stable on room air   - pivot to commode with walker and gaitbelt, assist of 2, NWB status maintained        - 1 Large BM this shift, voiding well  - left knee/ankle: immobilizer on, elevated x 2 pillows, c/d/I  - rated pain 3 or 4/10, scheduled tylenol given    Anticipating discharge to [facility], ride time 12:37      Goal Outcome Evaluation:      Plan of Care Reviewed With: patient    Overall Patient Progress: improving      Problem: Adult Inpatient Plan of Care  Goal: Absence of Hospital-Acquired Illness or Injury  Intervention: Prevent and Manage VTE (Venous Thromboembolism) Risk  Recent Flowsheet Documentation  Taken 3/11/2025 0400 by Kayleigh Choudhury RN  VTE Prevention/Management: SCDs on (sequential compression devices)     Problem: Adult Inpatient Plan of Care  Goal: Absence of Hospital-Acquired Illness or Injury  Intervention: Identify and Manage Fall Risk  Recent Flowsheet Documentation  Taken 3/11/2025 0400 by Kayleigh Choudhury RN  Safety Promotion/Fall Prevention:   activity supervised   assistive device/personal items within reach   clutter free environment maintained   increased rounding and observation   mobility aid in reach   nonskid shoes/slippers when out of bed   patient and family education   room near nurse's station   safety round/check completed     Problem: Pain Acute  Goal: Optimal Pain Control and Function  Outcome: Progressing     Problem: Orthopaedic Fracture  Goal: Absence of Bleeding  Intervention: Monitor and Manage Fracture Bleeding  Recent Flowsheet Documentation  Taken 3/11/2025 0400 by Kayleigh Choudhury RN  Fracture Immobilization:   immobilization device maintained   supported with pillows  Bleeding Management: affected area elevated     Problem: Delirium  Goal: Improved Sleep  Outcome: Progressing     Problem: Alcohol Withdrawal  Goal: Alcohol Withdrawal Symptom Control  Outcome: Progressing

## 2025-03-11 NOTE — DISCHARGE SUMMARY
[facility]  Hospitalist Discharge Summary      Date of Admission:  3/5/2025  Date of Discharge:  3/11/2025  Discharging Provider: Kyle Robles DO, DO  Discharge Service: Hospitalist Service    Discharge Diagnoses       Clinically Significant Risk Factors          Follow-ups Needed After Discharge       Unresulted Labs Ordered in the Past 30 Days of this Admission       No orders found from 2/3/2025 to 3/6/2025.            Discharge Disposition   Discharged to short-term care facility  Condition at discharge: Stable    Hospital Course     Kyle Lopez is a 80 year old male with a no significant past medical history who was admitted on 3/5/25 after presenting to [facility] ED for Multiple Fractures of the Left Lower Extremity..  Discharged to TCU.     Left Patella Fracture  Left Proximal Fibula Fracture  Left Distal Fibula Fracture  Left Medial Malleolus Fracture  - most likely mechanical fall  - s/p open reduction and internal fixation of left patella fracture and left ankle fracture old 3/6.  - Pain regimen as prescribed. Activity/weightbearing/orthopedic follow-up as advised by ortho.  -ASA 81 mg bid per ortho for dvct proph  - PT/OT at TCU  - Hgb 13.1 -> 13.3     Mechanical Fall  - Patient with fall as above leading to multiple fractures of LLE.   - Fall deemed mechanical - slipped on ice - no dizziness/syncope  - no documented head trauma      Alcohol Use Disorder w/o acute withdrawal  - Patient reports drinking 2-3 drinks everyday  - EtOH level in the ED was 0.00    Aortic root dilation, 4.1cm  Abnormal ECG  - TTE showed LVEF 55-60%, no reported RWMA's though sub-optimal study.  Sinus rhythm noted. Left atrial size normal.  - ECG (3/5/25 at 8:43pm) questioned possible atrial fibrillation.  However, on my interpretation P waves appear to be present in II, V2, wavy baseline.    -Repeat ECG  (3/5/25 at 9:30pm) showed NSR.  -I d/w both the spouse and patient at bedside on discharge.  He had  "no sx's consistent w/ PAF, has not had sx's in the past consistent w/ symptomatic atrial dysrhythmias that we agreed to place an outpatient referral to Cardiology for follow-up, and in regards to consideration of cardiac MRI.  I did inform of sx's to watch out for and report to provider if develops.    Consultations This Hospital Stay   PHYSICAL THERAPY ADULT IP CONSULT  OCCUPATIONAL THERAPY ADULT IP CONSULT  CARE MANAGEMENT / SOCIAL WORK IP CONSULT  ORTHOPEDIC SURGERY IP CONSULT  HOSPITALIST IP CONSULT  PHYSICAL THERAPY ADULT IP CONSULT  OCCUPATIONAL THERAPY ADULT IP CONSULT  PHYSICAL THERAPY ADULT IP CONSULT  OCCUPATIONAL THERAPY ADULT IP CONSULT  PHYSICAL THERAPY ADULT IP CONSULT  OCCUPATIONAL THERAPY ADULT IP CONSULT    Code Status   Full Code    Time Spent on this Encounter   IKyle DO, DO, personally saw the patient today and spent greater than 30 minutes discharging this patient.       Kyle Robles DO, DO  [address] [address]  Phone: [phone]  Fax: [phone]  ______________________________________________________________________    Physical Exam   Vital Signs: Temp: 99.7  F (37.6  C) Temp src: Oral BP: 125/67 Pulse: 86   Resp: 18 SpO2: 100 % O2 Device: None (Room air)    Weight: 210 lbs 0 oz    Gen nad  Cv rrr  Lungs cta  Neuo a&o       Primary Care Physician   Moose Barron    Discharge Orders      Reason for your hospital stay    S/p L ankle ORIF, L patella ORIF     When to call - Contact Surgeon Team    You may experience symptoms that require follow-up before your scheduled appointment. Refer to the \"Stoplight Tool\" for instructions on when to contact your Surgeon Team if you are concerned about pain control, blood clots, constipation, or if you are unable to urinate.     When to call - Reach out to Urgent Care    If you are not able to reach your Surgeon Team and you need immediate care, go to the Orthopedic " Walk-in Clinic or Urgent Care at your Surgeon's office.  Do NOT go to the Emergency Room unless you have shortness of breath, chest pain, or other signs of a medical emergency.     When to call - Reasons to Call 911    Call 911 immediately if you experience sudden-onset chest pain, arm weakness/numbness, slurred speech, or shortness of breath     Discharge Instruction - Breathing exercises    Perform breathing exercises using your Incentive Spirometer 10 times per hour while awake for 2 weeks.     Symptoms - Fever Management    A low grade fever can be expected after surgery.  Use acetaminophen (TYLENOL) as needed for fever management.  Contact your Surgeon Team if you have a fever greater than 101.5 F, chills, and/or night sweats.     Symptoms - Constipation management    Constipation (hard, dry bowel movements) is expected after surgery due to the combination of being less active, the anesthetic, and the opioid pain medication.  You can do the following to help reduce constipation:  ~  FLUIDS:  Drink clear liquids (water or Gatorade), or juice (apple/prune).  ~  DIET:  Eat a fiber rich diet.    ~  ACTIVITY:  Get up and move around several times a day.  Increase your activity as you are able.  MEDICATIONS:  Reduce the risk of constipation by starting medications before you are constipated.  You can take Miralax   (1 packet as directed) and/or a stool softener (Senokot 1-2 tablets 1-2 times a day).  If you already have constipation and these medications are not working, you can get magnesium citrate and use as directed.  If you continue to have constipation you can try an over the counter suppository or enema.  Call your Surgeon Team if it has been greater than 3 days since your last bowel movement.     Symptoms - Reduced Urine Output    Changes in the amount of fluids you drank before and after surgery may result in problems urinating.  It is important to stay well-hydrated after surgery and drink plenty of water.  If it has been greater than 8 hours since you have urinated despite drinking plenty of water, call your Surgeon Team.     Activity - Exercises to prevent blood clots    Unless otherwise directed by your Surgeon team, perform the following exercises at least three times per day for the first four weeks after surgery to prevent blood clots in your legs: 1) Point and flex your feet (Ankle Pumps), 2) Move your ankle around in big circles, 3) Wiggle your toes, 4) Walk, even for short distances, several times a day, will help decrease the risk of blood clots.     Comfort and Pain Management - Pain after Surgery    Pain after surgery is normal and expected.  You will have some amount of pain for several weeks after surgery.  Your pain will improve with time.  There are several things you can do to help reduce your pain including: rest, ice, elevation, and using pain medications as needed. Contact your Surgeon Team if you have pain that persists or worsens after surgery despite rest, ice, elevation, and taking your medication(s) as prescribed. Contact your Surgeon Team if you have new numbness, tingling, or weakness in your operative extremity.     Comfort and Pain Management - Swelling after Surgery    Swelling and/or bruising of the surgical extremity is common and may persist for several months after surgery. In addition to frequent icing and elevation, gentle compressive support with an ACE wrap or tubigrip may help with swelling. Apply compression regularly, removing at least twice daily to perform skin checks. Contact your Surgeon Team if your swelling increases and is NOT associated with an increase in your activity level, or if your swelling increases and is associated with redness and pain.     Comfort and Pain Management - LOWER Extremity Elevation    Swelling is expected for several months after surgery. This type of swelling is usually associated with gravity and activity, and can be improved with elevation.  "  The best way to do this is to get your \"toes above your nose\" by laying down and placing several pillows lengthwise under your calf and heel. When elevating your leg keep your knee completely straight. Perform this elevation as often as possible especially for the first two weeks after surgery.     Comfort and Pain Management - Cold therapy    Ice can be used to control swelling and discomfort after surgery. Place a thin towel over your operative site and apply the ice pack overtop. Leave ice pack in place for 20 minutes, then remove for 20 minutes. Repeat this 20 minutes on/20 minutes off routine as often as tolerated.     Medication Instructions - Acetaminophen (TYLENOL) Instructions    You were discharged with acetaminophen (TYLENOL) for pain management after surgery. Acetaminophen most effectively manages pain symptoms when it is taken on a schedule without missing doses (every four, six, or eight hours). Your Provider will prescribe a safe daily dose between 3000 - 4000 mg.  Do NOT exceed this daily dose. Most patients use acetaminophen for pain control for the first four weeks after surgery.  You can wean from this medication as your pain decreases.     Follow Up Care    Please follow-up with Dr. Jean' team in 2 weeks at [facility]. Call our scheduling line at [phone] to make an appointment, if you do not already have one scheduled.  Please follow-up with Dr. Walker' team in 2 weeks at [facility]. Call our scheduling line at [phone]  to make an appointment, if you do not already have one scheduled.     Medication instructions -  Anticoagulation - aspirin    Take the aspirin as prescribed for a total of four weeks after surgery.  This is given to help minimize your risk of blood clot.     Opioid Instructions (Greater than or equal to 65 years)    You were discharged with an opioid medication (hydromorphone, oxycodone, hydrocodone, or tramadol). This medication should only be taken " for breakthrough pain that is not controlled with acetaminophen (TYLENOL). If you rate your pain less than 3 you do not need this medication. Pain rating 0-3: You do not need this medication Pain rating 4-6: Take 1/2 tablet every 4-6 hours as needed Pain rating 7-10: Take 1 tablet every 4-6 hours as needed Do not exceed 4 tablets per day     Medication Instructions - Opioids - Tapering Instructions    In the first three days following surgery, your symptoms may warrant use of the narcotic pain medication every four to six hours as prescribed. This is normal. As your pain symptoms improve, focus your efforts on decreasing (tapering) use of narcotic medications. The most successful tapering strategy is to first, decrease the number of tablets you take every 4-6 hours to the minimum prescribed. Then, increase the amount of time between doses. For example: First, taper to   or 1 tablet every 4-6 hours. Then, taper to   or 1 tablet every 6-8 hours. Then, taper to   or 1 tablet every 8-10 hours. Then, taper to   or 1 tablet every 10-12 hours. Then, taper to   or 1 tablet at bedtime. The bedtime dose can help with comfort during sleep and is typically the last dose to be discontinued after surgery.     Activity    Activity as tolerated     Return to Driving    Return to driving - Driving is NOT permitted until directed by your provider. Under no circumstance are you permitted to drive while using narcotic pain medications.     Dressing / Wound Care - Wound    You have a clean dressing on your surgical wound. Dressing change instructions as follows: dressing will be removed at your follow-up appointment. Contact your Surgeon Team if you have increased redness, warmth around the surgical wound, and/or drainage from the surgical wound.     Dressing / Wound care - Shower with wound/dressing covered    You must COVER your dressing or incision with saran wrap (or any other non-permeable covering) to allow the incision to remain  dry while showering.  You may shower 2 days after surgery as long as the surgical wound stays dry. Continue to cover your dressing or incision for showering until your first office visit.  You are strictly prohibited from soaking or submerging the surgical wound underwater.     Dressing / Wound Care - NO Tub Bathing    Tub bathing, swimming, or any other activities that will cause your incision to be submerged in water should be avoided until allowed by your Surgeon.     NO weight bearing    No weight bearing on your operative extremity.     Hinged Knee Brace    Remove for twice daily for skin assessment and hygiene.  Brace settings: Locked in full extension  Wear hinged knee brace at all times except for skin assessment and hygiene. When removing, ensure that you are safely seated or lying in bed     NO range of motion     Return to Driving    Return to driving - Driving is NOT permitted until directed by your provider. Under no circumstance are you permitted to drive while using narcotic pain medications.     NO Precautions    No precautions directed by your Provider.  You may perform range of motion activities as tolerated.     NO weight bearing    No weight bearing on your operative extremity.     Dressing / Wound Care - Wound    You have a clean dressing on your surgical wound. Dressing change instructions as follows: dressing will be removed at your follow-up appointment. Contact your Surgeon Team if you have increased redness, warmth around the surgical wound, and/or drainage from the surgical wound.     Dressing / Wound care - Shower with wound/dressing covered    You must COVER your dressing or incision with saran wrap (or any other non-permeable covering) to allow the incision to remain dry while showering.  You may shower 2 days after surgery as long as the surgical wound stays dry. Continue to cover your dressing or incision for showering until your first office visit.  You are strictly prohibited from  soaking or submerging the surgical wound underwater.     Dressing / Wound Care - NO Tub Bathing    Tub bathing, swimming, or any other activities that will cause your incision to be submerged in water should be avoided until allowed by your Surgeon.     Splint / Cast    Do NOT remove your splint/cast.  Keep your splint/cast clean and dry.  If your splint/cast gets wet, contact your surgeon team.     General info for SNF    Length of Stay Estimate: Short Term Care: Estimated # of Days <30  Condition at Discharge: Stable  Level of care:skilled   Rehabilitation Potential: Good  Admission H&P remains valid and up-to-date: Yes  Recent Chemotherapy: N/A  Use Nursing Home Standing Orders: Yes     Mantoux instructions    Give two-step Mantoux (PPD) Per Facility Policy Yes     Follow Up and recommended labs and tests    Follow up with Nursing home physician.  No follow up labs or test are needed.     Daily weights    Call Provider for weight gain of more than 2 pounds per day or 5 pounds per week.     Intake and output    Every shift     Full Code     Physical Therapy Adult Consult    Evaluate and treat as clinically indicated.    Reason:  weakness     Occupational Therapy Adult Consult    Evaluate and treat as clinically indicated.    Reason:  weakness     Crutches DME    DME Documentation: Describe the reason for need to support medical necessity: Impaired gait status post knee surgery. I, the undersigned, certify that the above prescribed supplies are medically necessary for this patient and is both reasonable and necessary in reference to accepted standards of medical practice in the treatment of this patient's condition and is not prescribed as a convenience.     Cane DME    DME Documentation: Describe the reason for need to support medical necessity: Impaired gait status post knee surgery. I, the undersigned, certify that the above prescribed supplies are medically necessary for this patient and is both reasonable and  necessary in reference to accepted standards of medical practice in the treatment of this patient's condition and is not prescribed as a convenience.     Walker DME    DME Documentation: Describe the reason for need to support medical necessity: Impaired gait status post knee surgery. I, the undersigned, certify that the above prescribed supplies are medically necessary for this patient and is both reasonable and necessary in reference to accepted standards of medical practice in the treatment of this patient's condition and is not prescribed as a convenience.     Discharge Instruction - Regular Diet Adult    Return to your pre-surgery diet unless instructed otherwise     Diet    Follow this diet upon discharge: Current Diet:Orders Placed This Encounter      Advance Diet as Tolerated: Regular Diet Adult      Discharge Instruction - Regular Diet Adult       Significant Results and Procedures       Discharge Medications   Current Discharge Medication List        START taking these medications    Details   acetaminophen (TYLENOL) 325 MG tablet Take 3 tablets (975 mg) by mouth every 8 hours.  Qty: 100 tablet, Refills: 0    Associated Diagnoses: Closed displaced comminuted fracture of left patella, initial encounter; Closed fracture of distal end of left fibula, unspecified fracture morphology, initial encounter      aspirin 81 MG EC tablet Take 1 tablet (81 mg) by mouth 2 times daily.  Qty: 60 tablet, Refills: 0    Associated Diagnoses: Closed displaced comminuted fracture of left patella, initial encounter; Closed fracture of distal end of left fibula, unspecified fracture morphology, initial encounter      oxyCODONE (ROXICODONE) 5 MG tablet Take 1 tablet (5 mg) by mouth every 4 hours as needed for severe pain.  Qty: 20 tablet, Refills: 0    Associated Diagnoses: Closed displaced comminuted fracture of left patella, initial encounter; Closed fracture of distal end of left fibula, unspecified fracture morphology,  initial encounter      senna-docusate (SENOKOT-S/PERICOLACE) 8.6-50 MG tablet Take 1 tablet by mouth 2 times daily as needed for constipation.  Qty: 30 tablet, Refills: 0    Associated Diagnoses: Closed displaced comminuted fracture of left patella, initial encounter; Closed fracture of distal end of left fibula, unspecified fracture morphology, initial encounter           Allergies   Allergies   Allergen Reactions    Ciprofloxacin Other (See Comments) and Rash     Patient reports blood in the urine.

## 2025-03-11 NOTE — PLAN OF CARE
Goal Outcome Evaluation:    Pt c/o L knee and ankle pain 5 or 6/10.  Taking scheduled Tylenol and will let us know if he needs any other PRN's.  Using the urinal at the bedside, Up A1 NWB on L pivot to the commode at times.  Tolerating a regular diet.  VSS A&O x4,  Pt anticipating discharge tomorrow to TCU.      Problem: Adult Inpatient Plan of Care  Goal: Plan of Care Review  Description: The Plan of Care Review/Shift note should be completed every shift.  The Outcome Evaluation is a brief statement about your assessment that the patient is improving, declining, or no change.  This information will be displayed automatically on your shift  note.  Outcome: Progressing  Flowsheets (Taken 3/11/2025 0012)  Plan of Care Reviewed With:   patient   spouse  Overall Patient Progress: improving     Problem: Adult Inpatient Plan of Care  Goal: Optimal Comfort and Wellbeing  Outcome: Progressing     Problem: Adult Inpatient Plan of Care  Goal: Optimal Comfort and Wellbeing  Intervention: Monitor Pain and Promote Comfort  Recent Flowsheet Documentation  Taken 3/10/2025 1623 by Qing Velazquez, RN  Pain Management Interventions: medication offered but refused     Problem: Adult Inpatient Plan of Care  Goal: Readiness for Transition of Care  Outcome: Progressing     Problem: Pain Acute  Goal: Optimal Pain Control and Function  Outcome: Progressing         Plan of Care Reviewed With: patient, spouse    Overall Patient Progress: improvingOverall Patient Progress: improving

## 2025-03-11 NOTE — PLAN OF CARE
Occupational Therapy Discharge Summary    Reason for therapy discharge:    Discharged to TCU.    Progress towards therapy goal(s). See goals on Care Plan in [facility] electronic health record for goal details.  Progressing towards goals.    Therapy recommendation(s):    Recommend continued OT @ TCU.

## 2025-03-11 NOTE — PROGRESS NOTES
Care Management Discharge Note    Discharge Date: 03/11/2025       Discharge Disposition: Transitional Care- [facility]    Discharge Transportation: [employer] transport via wheelchair at 11am.     Private pay costs discussed: transportation costs    Does the patient's insurance plan have a 3 day qualifying hospital stay waiver?  No    PAS Confirmation Code:  [id number]  Patient/family educated on Medicare website which has current facility and service quality ratings:  yes    Education Provided on the Discharge Plan:  yes  Persons Notified of Discharge Plans: patient and wife updated at bedside   Patient/Family in Agreement with the Plan:      Handoff Referral Completed: No, handoff not indicated or clinically appropriate    Additional Information:  Patient discharging to [facility] TCU today. Discharge orders placed.  SW updated patient and his wife at bedside. They are aware of updated ride time for 11am.   Discharge orders sent to facility.   Vadim black received and approved.     Tiffanie Sheriff, LICSW

## (undated) DEVICE — SUCTION IRR SYSTEM W/O TIP INTERPULSE HANDPIECE 0210-100-000

## (undated) DEVICE — ESU GROUND PAD ADULT REM W/15' CORD E7507DB

## (undated) DEVICE — DRILL BIT MINI 100MM QC 2.7MM  310.26

## (undated) DEVICE — CUSTOM PACK TOTAL KNEE SOP5BTKHEC

## (undated) DEVICE — SOL NACL 0.9% IRRIG 1000ML BOTTLE 2F7124

## (undated) DEVICE — DRILL BIT QUICK COUPLING 2.0MM 310.201

## (undated) DEVICE — DRAPE C-ARM 60X42" 1013

## (undated) DEVICE — SUTURE VICRYL+ 0 27IN CT-1 UND VCP260H

## (undated) DEVICE — SUTURE MONOCRYL 3-0 18 PS2 UND MCP497G

## (undated) DEVICE — ESU PENCIL SMOKE EVAC W/ROCKER SWITCH 0703-047-000

## (undated) DEVICE — CUSTOM PACK LOWER EXTREMITY SOP5BLEHEA

## (undated) DEVICE — DRAPE STERI U 1015

## (undated) DEVICE — DRAPE STOCKINETTE IMPERVIOUS 12" 1587

## (undated) DEVICE — Device

## (undated) DEVICE — DRILL BIT SYN QUICK COUPLING 2.0X140MM  323.062

## (undated) DEVICE — GLOVE UNDER INDICATOR PI SZ 7.0 LF 41670

## (undated) DEVICE — SUTURE VICRYL+ 2-0 CT-2 27" UND VCP269H

## (undated) DEVICE — DRAPE C-ARMOR 5 SIDED 5523

## (undated) DEVICE — SOL ISOPROPYL RUBBING ALCOHOL USP 70% 4OZ HDX-20 I0020

## (undated) DEVICE — HOLDER LIMB VELCRO OR 0814-1533

## (undated) DEVICE — SUTURE VICRYL+ 1 27IN CT-1 UND VCP261H

## (undated) DEVICE — BONE CLEANING TIP INTERPULSE  0210-010-000

## (undated) DEVICE — PREP CHLORAPREP 26ML TINTED HI-LITE ORANGE 930815

## (undated) DEVICE — DRILL BIT QUICK COUPLING 2.5X110MM GOLD 310.25

## (undated) DEVICE — SU VICRYL 3-0 CT-2 27" UND J232H

## (undated) DEVICE — GLOVE BIOGEL PI ULTRATOUCH G SZ 7.5 42175

## (undated) DEVICE — GLOVE BIOGEL PI INDICATOR 8.0 LF 41680

## (undated) DEVICE — SUCTION MANIFOLD NEPTUNE 2 SYS 1 PORT 702-025-000

## (undated) DEVICE — GLOVE SURG PI ULTRA TOUCH M SZ 7 LF 42670

## (undated) DEVICE — IMM COOL TROM ADVANCE WITH HINGE 11-9114-9

## (undated) DEVICE — GLOVE BIOGEL PI SZ 8.0 40880

## (undated) DEVICE — CUFF TOURN 30IN STRL DISP 5921030235

## (undated) DEVICE — DRESSING MEPILEX ADH 4INX10IN STRL LF 496455

## (undated) DEVICE — SOL WATER IRRIG 1000ML BOTTLE 2F7114

## (undated) DEVICE — CAST PADDING 3" STERILE 9043S

## (undated) DEVICE — SYRINGE IRRIGATION BULB TIP 60ML STER LF DYND20125

## (undated) DEVICE — IMPLANTABLE DEVICE: Type: IMPLANTABLE DEVICE | Site: ANKLE | Status: NON-FUNCTIONAL

## (undated) DEVICE — IMP SCR SYN 2.7X20MM T8 SD LOCKING SELF-TAP VA 02.211.020: Type: IMPLANTABLE DEVICE | Site: KNEE | Status: NON-FUNCTIONAL

## (undated) DEVICE — SU ETHILON 3-0 PS-1 18" 1663G

## (undated) DEVICE — SUTURE VICRYL+ 2-0 27IN CT-1 UND VCP259H

## (undated) DEVICE — DRILL BIT SYN QUICK COUPLING 3.5X110MM 310.35

## (undated) DEVICE — DRESSING XEROFORM PETROLATUM 5X9 33605

## (undated) RX ORDER — PROPOFOL 10 MG/ML
INJECTION, EMULSION INTRAVENOUS
Status: DISPENSED
Start: 2025-03-06

## (undated) RX ORDER — FENTANYL CITRATE 50 UG/ML
INJECTION, SOLUTION INTRAMUSCULAR; INTRAVENOUS
Status: DISPENSED
Start: 2025-03-06

## (undated) RX ORDER — BUPIVACAINE HYDROCHLORIDE 5 MG/ML
INJECTION, SOLUTION PERINEURAL
Status: DISPENSED
Start: 2025-03-06